# Patient Record
Sex: FEMALE | Race: WHITE | NOT HISPANIC OR LATINO
[De-identification: names, ages, dates, MRNs, and addresses within clinical notes are randomized per-mention and may not be internally consistent; named-entity substitution may affect disease eponyms.]

---

## 2017-01-17 ENCOUNTER — APPOINTMENT (OUTPATIENT)
Dept: ORTHOPEDIC SURGERY | Facility: CLINIC | Age: 67
End: 2017-01-17

## 2017-01-17 VITALS — WEIGHT: 226 LBS | BODY MASS INDEX: 44.37 KG/M2 | HEIGHT: 60 IN

## 2017-01-17 DIAGNOSIS — S43.81XA SPRAIN OF OTHER SPECIFIED PARTS OF RIGHT SHOULDER GIRDLE, INITIAL ENCOUNTER: ICD-10-CM

## 2017-01-17 DIAGNOSIS — S46.111A STRAIN OF MUSCLE, FASCIA AND TENDON OF LONG HEAD OF BICEPS, RIGHT ARM, INITIAL ENCOUNTER: ICD-10-CM

## 2017-01-17 DIAGNOSIS — M75.121 COMPLETE ROTATOR CUFF TEAR OR RUPTURE OF RIGHT SHOULDER, NOT SPECIFIED AS TRAUMATIC: ICD-10-CM

## 2018-02-21 ENCOUNTER — APPOINTMENT (OUTPATIENT)
Dept: VASCULAR SURGERY | Facility: CLINIC | Age: 68
End: 2018-02-21
Payer: MEDICARE

## 2018-02-21 VITALS
DIASTOLIC BLOOD PRESSURE: 74 MMHG | HEIGHT: 60 IN | OXYGEN SATURATION: 94 % | WEIGHT: 230 LBS | SYSTOLIC BLOOD PRESSURE: 153 MMHG | HEART RATE: 81 BPM | BODY MASS INDEX: 45.16 KG/M2

## 2018-02-21 PROCEDURE — 99213 OFFICE O/P EST LOW 20 MIN: CPT | Mod: 25

## 2018-02-21 PROCEDURE — 93971 EXTREMITY STUDY: CPT

## 2018-07-19 VITALS
OXYGEN SATURATION: 98 % | RESPIRATION RATE: 18 BRPM | TEMPERATURE: 98 F | SYSTOLIC BLOOD PRESSURE: 142 MMHG | HEIGHT: 60 IN | DIASTOLIC BLOOD PRESSURE: 64 MMHG | WEIGHT: 229.5 LBS | HEART RATE: 93 BPM

## 2018-07-19 RX ORDER — TRIMETHOPRIM HYDROCHLORIDE 50 MG/5ML
0 SOLUTION ORAL
Qty: 0 | Refills: 0 | COMMUNITY

## 2018-07-19 RX ORDER — METHOCARBAMOL 500 MG/1
0 TABLET, FILM COATED ORAL
Qty: 0 | Refills: 0 | COMMUNITY

## 2018-07-19 NOTE — PATIENT PROFILE ADULT. - PSH
H/O arthroscopy of left knee    H/O arthroscopy of right knee  x2  H/O carpal tunnel repair  left  H/O vascular surgery  left lower extremity  History of cholecystectomy    History of total hysterectomy    S/P rotator cuff repair  right H/O arthroscopy of left knee    H/O arthroscopy of right knee  x2  H/O carpal tunnel repair  left  H/O vascular surgery  left lower extremity  History of cholecystectomy    History of pubovaginal sling    History of total hysterectomy    S/P rotator cuff repair  right

## 2018-07-19 NOTE — PATIENT PROFILE ADULT. - PMH
Aortic valve disorder    Asthma    GERD (gastroesophageal reflux disease)    Hypercholesterolemia    Hypertension    Hypothyroidism    Lumbar spondylosis    Lung nodule    MERYL (obstructive sleep apnea)  syndrome  Osteoarthritis of knee    Peripheral venous insufficiency    Recurrent urinary tract infection    Vitamin D deficiency Aortic valve disorder    Asthma    GERD (gastroesophageal reflux disease)    Hypercholesterolemia    Hypothyroidism    Lumbar spondylosis    Lung nodule  right  MERYL (obstructive sleep apnea)  syndrome  Osteoarthritis of knee    Peripheral venous insufficiency    Recurrent urinary tract infection    Vitamin D deficiency Aortic valve disorder    Asthma    GERD (gastroesophageal reflux disease)    Hypercholesterolemia    Hypothyroidism    Lumbar spondylosis    MERYL (obstructive sleep apnea)  syndrome  MERYL on CPAP    Osteoarthritis of knee    Peripheral venous insufficiency    Recurrent urinary tract infection    Vitamin D deficiency

## 2018-07-20 ENCOUNTER — INPATIENT (INPATIENT)
Facility: HOSPITAL | Age: 68
LOS: 5 days | Discharge: HOME HEALTH | DRG: 519 | End: 2018-07-26
Attending: NEUROLOGICAL SURGERY | Admitting: NEUROLOGICAL SURGERY
Payer: MEDICARE

## 2018-07-20 DIAGNOSIS — M47.816 SPONDYLOSIS WITHOUT MYELOPATHY OR RADICULOPATHY, LUMBAR REGION: ICD-10-CM

## 2018-07-20 DIAGNOSIS — Z90.710 ACQUIRED ABSENCE OF BOTH CERVIX AND UTERUS: Chronic | ICD-10-CM

## 2018-07-20 DIAGNOSIS — Z98.890 OTHER SPECIFIED POSTPROCEDURAL STATES: Chronic | ICD-10-CM

## 2018-07-20 DIAGNOSIS — Z90.49 ACQUIRED ABSENCE OF OTHER SPECIFIED PARTS OF DIGESTIVE TRACT: Chronic | ICD-10-CM

## 2018-07-20 RX ORDER — VANCOMYCIN HCL 1 G
1000 VIAL (EA) INTRAVENOUS EVERY 12 HOURS
Qty: 0 | Refills: 0 | Status: COMPLETED | OUTPATIENT
Start: 2018-07-20 | End: 2018-07-21

## 2018-07-20 RX ORDER — BUDESONIDE AND FORMOTEROL FUMARATE DIHYDRATE 160; 4.5 UG/1; UG/1
2 AEROSOL RESPIRATORY (INHALATION)
Qty: 0 | Refills: 0 | Status: DISCONTINUED | OUTPATIENT
Start: 2018-07-20 | End: 2018-07-20

## 2018-07-20 RX ORDER — OXYCODONE AND ACETAMINOPHEN 5; 325 MG/1; MG/1
1 TABLET ORAL EVERY 4 HOURS
Qty: 0 | Refills: 0 | Status: DISCONTINUED | OUTPATIENT
Start: 2018-07-20 | End: 2018-07-22

## 2018-07-20 RX ORDER — ALBUTEROL 90 UG/1
2 AEROSOL, METERED ORAL
Qty: 0 | Refills: 0 | COMMUNITY

## 2018-07-20 RX ORDER — OXYCODONE AND ACETAMINOPHEN 5; 325 MG/1; MG/1
2 TABLET ORAL EVERY 6 HOURS
Qty: 0 | Refills: 0 | Status: DISCONTINUED | OUTPATIENT
Start: 2018-07-20 | End: 2018-07-22

## 2018-07-20 RX ORDER — SODIUM CHLORIDE 9 MG/ML
1000 INJECTION INTRAMUSCULAR; INTRAVENOUS; SUBCUTANEOUS
Qty: 0 | Refills: 0 | Status: DISCONTINUED | OUTPATIENT
Start: 2018-07-20 | End: 2018-07-22

## 2018-07-20 RX ORDER — DOCUSATE SODIUM 100 MG
100 CAPSULE ORAL THREE TIMES A DAY
Qty: 0 | Refills: 0 | Status: DISCONTINUED | OUTPATIENT
Start: 2018-07-20 | End: 2018-07-26

## 2018-07-20 RX ORDER — FLUTICASONE PROPIONATE AND SALMETEROL 50; 250 UG/1; UG/1
1 POWDER ORAL; RESPIRATORY (INHALATION)
Qty: 0 | Refills: 0 | COMMUNITY

## 2018-07-20 RX ORDER — SIMVASTATIN 20 MG/1
1 TABLET, FILM COATED ORAL
Qty: 0 | Refills: 0 | COMMUNITY

## 2018-07-20 RX ORDER — METOCLOPRAMIDE HCL 10 MG
10 TABLET ORAL ONCE
Qty: 0 | Refills: 0 | Status: COMPLETED | OUTPATIENT
Start: 2018-07-20 | End: 2018-07-20

## 2018-07-20 RX ORDER — SIMVASTATIN 20 MG/1
20 TABLET, FILM COATED ORAL AT BEDTIME
Qty: 0 | Refills: 0 | Status: DISCONTINUED | OUTPATIENT
Start: 2018-07-20 | End: 2018-07-26

## 2018-07-20 RX ORDER — ONDANSETRON 8 MG/1
4 TABLET, FILM COATED ORAL EVERY 6 HOURS
Qty: 0 | Refills: 0 | Status: DISCONTINUED | OUTPATIENT
Start: 2018-07-20 | End: 2018-07-26

## 2018-07-20 RX ORDER — HYDROMORPHONE HYDROCHLORIDE 2 MG/ML
1 INJECTION INTRAMUSCULAR; INTRAVENOUS; SUBCUTANEOUS ONCE
Qty: 0 | Refills: 0 | Status: DISCONTINUED | OUTPATIENT
Start: 2018-07-20 | End: 2018-07-20

## 2018-07-20 RX ORDER — SENNA PLUS 8.6 MG/1
2 TABLET ORAL AT BEDTIME
Qty: 0 | Refills: 0 | Status: DISCONTINUED | OUTPATIENT
Start: 2018-07-20 | End: 2018-07-26

## 2018-07-20 RX ORDER — ALBUTEROL 90 UG/1
2 AEROSOL, METERED ORAL
Qty: 0 | Refills: 0 | Status: DISCONTINUED | OUTPATIENT
Start: 2018-07-20 | End: 2018-07-26

## 2018-07-20 RX ORDER — BUDESONIDE AND FORMOTEROL FUMARATE DIHYDRATE 160; 4.5 UG/1; UG/1
2 AEROSOL RESPIRATORY (INHALATION)
Qty: 0 | Refills: 0 | Status: DISCONTINUED | OUTPATIENT
Start: 2018-07-20 | End: 2018-07-26

## 2018-07-20 RX ORDER — LEVOTHYROXINE SODIUM 125 MCG
25 TABLET ORAL DAILY
Qty: 0 | Refills: 0 | Status: DISCONTINUED | OUTPATIENT
Start: 2018-07-20 | End: 2018-07-26

## 2018-07-20 RX ORDER — HYDROMORPHONE HYDROCHLORIDE 2 MG/ML
1 INJECTION INTRAMUSCULAR; INTRAVENOUS; SUBCUTANEOUS
Qty: 0 | Refills: 0 | Status: DISCONTINUED | OUTPATIENT
Start: 2018-07-20 | End: 2018-07-20

## 2018-07-20 RX ORDER — ESOMEPRAZOLE MAGNESIUM 40 MG/1
1 CAPSULE, DELAYED RELEASE ORAL
Qty: 0 | Refills: 0 | COMMUNITY

## 2018-07-20 RX ORDER — PANTOPRAZOLE SODIUM 20 MG/1
40 TABLET, DELAYED RELEASE ORAL
Qty: 0 | Refills: 0 | Status: DISCONTINUED | OUTPATIENT
Start: 2018-07-20 | End: 2018-07-21

## 2018-07-20 RX ORDER — LEVOTHYROXINE SODIUM 125 MCG
1 TABLET ORAL
Qty: 0 | Refills: 0 | COMMUNITY

## 2018-07-20 RX ADMIN — HYDROMORPHONE HYDROCHLORIDE 1 MILLIGRAM(S): 2 INJECTION INTRAMUSCULAR; INTRAVENOUS; SUBCUTANEOUS at 16:43

## 2018-07-20 RX ADMIN — ONDANSETRON 4 MILLIGRAM(S): 8 TABLET, FILM COATED ORAL at 21:42

## 2018-07-20 RX ADMIN — HYDROMORPHONE HYDROCHLORIDE 1 MILLIGRAM(S): 2 INJECTION INTRAMUSCULAR; INTRAVENOUS; SUBCUTANEOUS at 11:15

## 2018-07-20 RX ADMIN — HYDROMORPHONE HYDROCHLORIDE 1 MILLIGRAM(S): 2 INJECTION INTRAMUSCULAR; INTRAVENOUS; SUBCUTANEOUS at 22:18

## 2018-07-20 RX ADMIN — SODIUM CHLORIDE 60 MILLILITER(S): 9 INJECTION INTRAMUSCULAR; INTRAVENOUS; SUBCUTANEOUS at 16:46

## 2018-07-20 RX ADMIN — Medication 250 MILLIGRAM(S): at 18:09

## 2018-07-20 RX ADMIN — ONDANSETRON 4 MILLIGRAM(S): 8 TABLET, FILM COATED ORAL at 14:07

## 2018-07-20 RX ADMIN — HYDROMORPHONE HYDROCHLORIDE 1 MILLIGRAM(S): 2 INJECTION INTRAMUSCULAR; INTRAVENOUS; SUBCUTANEOUS at 23:00

## 2018-07-20 RX ADMIN — HYDROMORPHONE HYDROCHLORIDE 1 MILLIGRAM(S): 2 INJECTION INTRAMUSCULAR; INTRAVENOUS; SUBCUTANEOUS at 11:01

## 2018-07-20 RX ADMIN — HYDROMORPHONE HYDROCHLORIDE 1 MILLIGRAM(S): 2 INJECTION INTRAMUSCULAR; INTRAVENOUS; SUBCUTANEOUS at 17:00

## 2018-07-20 RX ADMIN — Medication 10 MILLIGRAM(S): at 17:03

## 2018-07-20 NOTE — BRIEF OPERATIVE NOTE - PROCEDURE
<<-----Click on this checkbox to enter Procedure Lumbar laminectomy  07/20/2018  Left L2-L3, L3-L4  Active  VZHIGIN

## 2018-07-20 NOTE — PROGRESS NOTE ADULT - SUBJECTIVE AND OBJECTIVE BOX
NEUROSURGERY POST OP NOTE:    POD# 0 S/P left L2-L3, L3-L4 lumbar laminectomy, intraop CSF leak repaired with muscle tissue and dura seal    S: Pt seen and examined at bedside. Reports mild incision site pain. Denies acute weakness/loss of sensation of extremities      T(C): 35.4 (07-20-18 @ 10:35), Max: 35.4 (07-20-18 @ 10:35)  HR: 92 (07-20-18 @ 14:00) (82 - 92)  BP: 146/61 (07-20-18 @ 14:00) (122/59 - 156/61)  RR: 12 (07-20-18 @ 14:00) (12 - 18)  SpO2: 98% (07-20-18 @ 14:00) (94% - 100%)      07-20-18 @ 07:01  -  07-20-18 @ 15:05  --------------------------------------------------------  IN: 180 mL / OUT: 900 mL / NET: -720 mL      buDESOnide 160 MICROgram(s)/formoterol 4.5 MICROgram(s) Inhaler 2 Puff(s) Inhalation two times a day  docusate sodium 100 milliGRAM(s) Oral three times a day  HYDROmorphone  Injectable 1 milliGRAM(s) IV Push every 1 hour PRN  ondansetron Injectable 4 milliGRAM(s) IV Push every 6 hours PRN  oxyCODONE    5 mG/acetaminophen 325 mG 1 Tablet(s) Oral every 4 hours PRN  oxyCODONE    5 mG/acetaminophen 325 mG 2 Tablet(s) Oral every 6 hours PRN  senna 2 Tablet(s) Oral at bedtime PRN  sodium chloride 0.9%. 1000 milliLiter(s) IV Continuous <Continuous>  vancomycin  IVPB 1000 milliGRAM(s) IV Intermittent every 12 hours    Exam:  Neurological: AAOx3, FC, speech coherent  CNII-XII: EOM intact, PERRL, face symmetric, tongue midline  Motor: MAEx 4 5/5 UE and LE B/L  SILT throughout  Back incision site C/D/I    Assessment: 68yFemale s/p left L2-L3, L3-L4 lumbar laminectomy, intraop CSF leak repaired with muscle tissue and dura seal    Plan:  -spinal checks  -abdominal binder for comfort  -flat x48h  -remove hernandez in AM  -IVF hydration for now, advance diet as tolerated  -PT/OOB  -DVT prophylaxis: SCDs  -pain control: percocet, dilaudid IV prn, postop vanco  -continue home medications: zocor, synthroid, advair  -postop vanco  -Dispo pending  -D/w Dr. Figueroa NEUROSURGERY POST OP NOTE:    POD# 0 S/P left L2-L3, L3-L4 lumbar laminectomy, intraop CSF leak repaired with muscle tissue and dura seal    S: Pt seen and examined at bedside. Reports mild incision site pain. Denies acute weakness/loss of sensation of extremities      T(C): 35.4 (07-20-18 @ 10:35), Max: 35.4 (07-20-18 @ 10:35)  HR: 92 (07-20-18 @ 14:00) (82 - 92)  BP: 146/61 (07-20-18 @ 14:00) (122/59 - 156/61)  RR: 12 (07-20-18 @ 14:00) (12 - 18)  SpO2: 98% (07-20-18 @ 14:00) (94% - 100%)      07-20-18 @ 07:01  -  07-20-18 @ 15:05  --------------------------------------------------------  IN: 180 mL / OUT: 900 mL / NET: -720 mL      buDESOnide 160 MICROgram(s)/formoterol 4.5 MICROgram(s) Inhaler 2 Puff(s) Inhalation two times a day  docusate sodium 100 milliGRAM(s) Oral three times a day  HYDROmorphone  Injectable 1 milliGRAM(s) IV Push every 1 hour PRN  ondansetron Injectable 4 milliGRAM(s) IV Push every 6 hours PRN  oxyCODONE    5 mG/acetaminophen 325 mG 1 Tablet(s) Oral every 4 hours PRN  oxyCODONE    5 mG/acetaminophen 325 mG 2 Tablet(s) Oral every 6 hours PRN  senna 2 Tablet(s) Oral at bedtime PRN  sodium chloride 0.9%. 1000 milliLiter(s) IV Continuous <Continuous>  vancomycin  IVPB 1000 milliGRAM(s) IV Intermittent every 12 hours    Exam:  Neurological: AAOx3, FC, speech coherent  CNII-XII: EOM intact, PERRL, face symmetric, tongue midline  Motor: MAEx 4 5/5 UE and LE B/L  SILT throughout  Back incision site C/D/I    Assessment: 68yFemale s/p left L2-L3, L3-L4 lumbar laminectomy, intraop CSF leak repaired with muscle tissue and dura seal    Plan:  -spinal checks  -abdominal binder for comfort  -flat x48h, defer PT for now  -remove hernandez in AM  -IVF hydration for now, advance diet as tolerated  -DVT prophylaxis: SCDs  -pain control: percocet, dilaudid IV prn, postop vanco  -continue home medications: zocor, synthroid, advair  -postop vanco  -Dispo pending  -D/w Dr. Figueroa

## 2018-07-20 NOTE — H&P PST ADULT - ASSESSMENT
Pt is 67yo female with Lower back pain and left leg pain, here for an elective surgery with Dr. Figueroa

## 2018-07-20 NOTE — H&P PST ADULT - PSH
H/O arthroscopy of left knee    H/O arthroscopy of right knee  x2  H/O carpal tunnel repair  left  H/O vascular surgery  left lower extremity  History of cholecystectomy    History of pubovaginal sling    History of total hysterectomy    S/P rotator cuff repair  right

## 2018-07-20 NOTE — H&P PST ADULT - HISTORY OF PRESENT ILLNESS
Pt is 69yo female with Lower back pain and left leg pain, here for an elective surgery with Dr. Figueroa

## 2018-07-20 NOTE — H&P PST ADULT - PMH
Aortic valve disorder    Asthma    GERD (gastroesophageal reflux disease)    Hypercholesterolemia    Hypothyroidism    Lumbar spondylosis    MERYL (obstructive sleep apnea)  syndrome  MERYL on CPAP    Osteoarthritis of knee    Peripheral venous insufficiency    Recurrent urinary tract infection    Vitamin D deficiency

## 2018-07-21 LAB
ANION GAP SERPL CALC-SCNC: 13 MMOL/L — SIGNIFICANT CHANGE UP (ref 5–17)
BUN SERPL-MCNC: 15 MG/DL — SIGNIFICANT CHANGE UP (ref 7–23)
CALCIUM SERPL-MCNC: 8.4 MG/DL — SIGNIFICANT CHANGE UP (ref 8.4–10.5)
CHLORIDE SERPL-SCNC: 102 MMOL/L — SIGNIFICANT CHANGE UP (ref 96–108)
CO2 SERPL-SCNC: 26 MMOL/L — SIGNIFICANT CHANGE UP (ref 22–31)
CREAT SERPL-MCNC: 0.65 MG/DL — SIGNIFICANT CHANGE UP (ref 0.5–1.3)
GLUCOSE SERPL-MCNC: 123 MG/DL — HIGH (ref 70–99)
HCT VFR BLD CALC: 38.3 % — SIGNIFICANT CHANGE UP (ref 34.5–45)
HGB BLD-MCNC: 11.7 G/DL — SIGNIFICANT CHANGE UP (ref 11.5–15.5)
MAGNESIUM SERPL-MCNC: 2.2 MG/DL — SIGNIFICANT CHANGE UP (ref 1.6–2.6)
MCHC RBC-ENTMCNC: 25.3 PG — LOW (ref 27–34)
MCHC RBC-ENTMCNC: 30.5 G/DL — LOW (ref 32–36)
MCV RBC AUTO: 82.9 FL — SIGNIFICANT CHANGE UP (ref 80–100)
PHOSPHATE SERPL-MCNC: 4.3 MG/DL — SIGNIFICANT CHANGE UP (ref 2.5–4.5)
PLATELET # BLD AUTO: 345 K/UL — SIGNIFICANT CHANGE UP (ref 150–400)
POTASSIUM SERPL-MCNC: 3.8 MMOL/L — SIGNIFICANT CHANGE UP (ref 3.5–5.3)
POTASSIUM SERPL-SCNC: 3.8 MMOL/L — SIGNIFICANT CHANGE UP (ref 3.5–5.3)
RBC # BLD: 4.62 M/UL — SIGNIFICANT CHANGE UP (ref 3.8–5.2)
RBC # FLD: 14.7 % — SIGNIFICANT CHANGE UP (ref 10.3–16.9)
SODIUM SERPL-SCNC: 141 MMOL/L — SIGNIFICANT CHANGE UP (ref 135–145)
WBC # BLD: 14.8 K/UL — HIGH (ref 3.8–10.5)
WBC # FLD AUTO: 14.8 K/UL — HIGH (ref 3.8–10.5)

## 2018-07-21 RX ORDER — ACETAMINOPHEN 500 MG
650 TABLET ORAL ONCE
Qty: 0 | Refills: 0 | Status: COMPLETED | OUTPATIENT
Start: 2018-07-21 | End: 2018-07-21

## 2018-07-21 RX ORDER — HYDROMORPHONE HYDROCHLORIDE 2 MG/ML
0.5 INJECTION INTRAMUSCULAR; INTRAVENOUS; SUBCUTANEOUS ONCE
Qty: 0 | Refills: 0 | Status: DISCONTINUED | OUTPATIENT
Start: 2018-07-21 | End: 2018-07-21

## 2018-07-21 RX ORDER — OXYCODONE HYDROCHLORIDE 5 MG/1
10 TABLET ORAL
Qty: 0 | Refills: 0 | Status: DISCONTINUED | OUTPATIENT
Start: 2018-07-21 | End: 2018-07-22

## 2018-07-21 RX ORDER — OXYCODONE HYDROCHLORIDE 5 MG/1
10 TABLET ORAL ONCE
Qty: 0 | Refills: 0 | Status: DISCONTINUED | OUTPATIENT
Start: 2018-07-21 | End: 2018-07-21

## 2018-07-21 RX ADMIN — PANTOPRAZOLE SODIUM 40 MILLIGRAM(S): 20 TABLET, DELAYED RELEASE ORAL at 17:58

## 2018-07-21 RX ADMIN — BUDESONIDE AND FORMOTEROL FUMARATE DIHYDRATE 2 PUFF(S): 160; 4.5 AEROSOL RESPIRATORY (INHALATION) at 17:58

## 2018-07-21 RX ADMIN — Medication 250 MILLIGRAM(S): at 06:04

## 2018-07-21 RX ADMIN — SIMVASTATIN 20 MILLIGRAM(S): 20 TABLET, FILM COATED ORAL at 21:15

## 2018-07-21 RX ADMIN — HYDROMORPHONE HYDROCHLORIDE 0.5 MILLIGRAM(S): 2 INJECTION INTRAMUSCULAR; INTRAVENOUS; SUBCUTANEOUS at 04:09

## 2018-07-21 RX ADMIN — HYDROMORPHONE HYDROCHLORIDE 0.5 MILLIGRAM(S): 2 INJECTION INTRAMUSCULAR; INTRAVENOUS; SUBCUTANEOUS at 19:00

## 2018-07-21 RX ADMIN — OXYCODONE HYDROCHLORIDE 10 MILLIGRAM(S): 5 TABLET ORAL at 11:19

## 2018-07-21 RX ADMIN — Medication 650 MILLIGRAM(S): at 14:52

## 2018-07-21 RX ADMIN — OXYCODONE AND ACETAMINOPHEN 2 TABLET(S): 5; 325 TABLET ORAL at 23:38

## 2018-07-21 RX ADMIN — Medication 100 MILLIGRAM(S): at 21:15

## 2018-07-21 RX ADMIN — OXYCODONE HYDROCHLORIDE 10 MILLIGRAM(S): 5 TABLET ORAL at 12:10

## 2018-07-21 RX ADMIN — Medication 25 MICROGRAM(S): at 06:04

## 2018-07-21 RX ADMIN — HYDROMORPHONE HYDROCHLORIDE 0.5 MILLIGRAM(S): 2 INJECTION INTRAMUSCULAR; INTRAVENOUS; SUBCUTANEOUS at 18:08

## 2018-07-21 RX ADMIN — OXYCODONE AND ACETAMINOPHEN 2 TABLET(S): 5; 325 TABLET ORAL at 21:15

## 2018-07-21 RX ADMIN — Medication 650 MILLIGRAM(S): at 15:20

## 2018-07-21 NOTE — PROGRESS NOTE ADULT - SUBJECTIVE AND OBJECTIVE BOX
HPI:  Pt is 67yo female with Lower back pain and left leg pain, here for an elective surgery with Dr. Figueroa (20 Jul 2018 09:57)    OVERNIGHT EVENTS: No acute events overnight. Patient c/o HA and incisional pain. Afebrile, neuro stable    7/20: POD #0 s/p L L2-3 and L3-4 laminectomy, CSF leak intraop   7/21: POD #1 c/o HA and incisional pain overnight. Patient flat today. Afebrile, neuro stable    Vital Signs Last 24 Hrs  T(C): 37.3 (20 Jul 2018 21:34), Max: 37.3 (20 Jul 2018 21:34)  T(F): 99.1 (20 Jul 2018 21:34), Max: 99.1 (20 Jul 2018 21:34)  HR: 96 (21 Jul 2018 00:20) (82 - 104)  BP: 113/54 (20 Jul 2018 21:34) (106/53 - 156/61)  BP(mean): 96 (20 Jul 2018 12:35) (92 - 126)  RR: 15 (21 Jul 2018 00:20) (12 - 18)  SpO2: 97% (21 Jul 2018 00:20) (94% - 100%)    I&O's Summary    20 Jul 2018 07:01  -  21 Jul 2018 01:28  --------------------------------------------------------  IN: 1340 mL / OUT: 1655 mL / NET: -315 mL        PHYSICAL EXAM:  Gen: laying in hospital bed in supine, NAD  Neurological: AA+Ox3, opens eyes spontaneously FC  CN II-XII grossly intact, PERRL, EOMI  Motor exam:  MAEx4, 5/5 strength throughout  Cardiovascular: regular rate and rhythm  Respiratory: clear to auscultation  Gastrointestinal: soft, nontender, nondistended  Incision/Wound: C/D/I    TUBES/LINES:  [] Hernandez  [] Lumbar Drain  [] Wound Drains  [] Others      DIET:  [] NPO  [x] Mechanical  [] Tube feeds    LABS:                  CAPILLARY BLOOD GLUCOSE          Drug Levels: [] N/A    CSF Analysis: [] N/A      Allergies    Ceclor (Swelling)  Flagyl (Rash)  gabapentin (Rash)  lidocaine (Headache)  tramadol (Rash)    Intolerances      MEDICATIONS:  Antibiotics:  vancomycin  IVPB 1000 milliGRAM(s) IV Intermittent every 12 hours    Neuro:  HYDROmorphone  Injectable 0.5 milliGRAM(s) IV Push once  ondansetron Injectable 4 milliGRAM(s) IV Push every 6 hours PRN  oxyCODONE    5 mG/acetaminophen 325 mG 1 Tablet(s) Oral every 4 hours PRN  oxyCODONE    5 mG/acetaminophen 325 mG 2 Tablet(s) Oral every 6 hours PRN    Anticoagulation:    OTHER:  ALBUTerol    90 MICROgram(s) HFA Inhaler 2 Puff(s) Inhalation two times a day PRN  buDESOnide 160 MICROgram(s)/formoterol 4.5 MICROgram(s) Inhaler 2 Puff(s) Inhalation two times a day  docusate sodium 100 milliGRAM(s) Oral three times a day  levothyroxine 25 MICROGram(s) Oral daily  pantoprazole    Tablet 40 milliGRAM(s) Oral before breakfast  senna 2 Tablet(s) Oral at bedtime PRN  simvastatin 20 milliGRAM(s) Oral at bedtime    IVF:  sodium chloride 0.9%. 1000 milliLiter(s) IV Continuous <Continuous>    CULTURES:    RADIOLOGY & ADDITIONAL TESTS:      ASSESSMENT:  68y Female s/p L L2-3 and L3-4 laminectomy,    PLAN:  NEURO:  - neuro checks  - vitals checks  - pain control  - remain flat for 48 hours  - continue simvastatin   - continue Synthroid   - regular diet  - bowel regimen: colace, senna  - hernandez in place   - DVT PROPHYLAXIS: SCD's  - no PT/OOB until Sunday     DISPOSITION: floor status    d/w Dr. Figueroa

## 2018-07-22 LAB
ANION GAP SERPL CALC-SCNC: 11 MMOL/L — SIGNIFICANT CHANGE UP (ref 5–17)
APPEARANCE UR: CLEAR — SIGNIFICANT CHANGE UP
BILIRUB UR-MCNC: NEGATIVE — SIGNIFICANT CHANGE UP
BUN SERPL-MCNC: 10 MG/DL — SIGNIFICANT CHANGE UP (ref 7–23)
CALCIUM SERPL-MCNC: 8.7 MG/DL — SIGNIFICANT CHANGE UP (ref 8.4–10.5)
CHLORIDE SERPL-SCNC: 100 MMOL/L — SIGNIFICANT CHANGE UP (ref 96–108)
CO2 SERPL-SCNC: 26 MMOL/L — SIGNIFICANT CHANGE UP (ref 22–31)
COLOR SPEC: YELLOW — SIGNIFICANT CHANGE UP
CREAT SERPL-MCNC: 0.5 MG/DL — SIGNIFICANT CHANGE UP (ref 0.5–1.3)
DIFF PNL FLD: ABNORMAL
GLUCOSE SERPL-MCNC: 128 MG/DL — HIGH (ref 70–99)
GLUCOSE UR QL: NEGATIVE — SIGNIFICANT CHANGE UP
HCT VFR BLD CALC: 40.2 % — SIGNIFICANT CHANGE UP (ref 34.5–45)
HGB BLD-MCNC: 12.6 G/DL — SIGNIFICANT CHANGE UP (ref 11.5–15.5)
KETONES UR-MCNC: NEGATIVE — SIGNIFICANT CHANGE UP
LEUKOCYTE ESTERASE UR-ACNC: NEGATIVE — SIGNIFICANT CHANGE UP
MAGNESIUM SERPL-MCNC: 2.1 MG/DL — SIGNIFICANT CHANGE UP (ref 1.6–2.6)
MCHC RBC-ENTMCNC: 25.8 PG — LOW (ref 27–34)
MCHC RBC-ENTMCNC: 31.3 G/DL — LOW (ref 32–36)
MCV RBC AUTO: 82.4 FL — SIGNIFICANT CHANGE UP (ref 80–100)
NITRITE UR-MCNC: NEGATIVE — SIGNIFICANT CHANGE UP
PH UR: 6.5 — SIGNIFICANT CHANGE UP (ref 5–8)
PHOSPHATE SERPL-MCNC: 2.3 MG/DL — LOW (ref 2.5–4.5)
PLATELET # BLD AUTO: 291 K/UL — SIGNIFICANT CHANGE UP (ref 150–400)
POTASSIUM SERPL-MCNC: 3.8 MMOL/L — SIGNIFICANT CHANGE UP (ref 3.5–5.3)
POTASSIUM SERPL-SCNC: 3.8 MMOL/L — SIGNIFICANT CHANGE UP (ref 3.5–5.3)
PROT UR-MCNC: 30 MG/DL
RBC # BLD: 4.88 M/UL — SIGNIFICANT CHANGE UP (ref 3.8–5.2)
RBC # FLD: 14.9 % — SIGNIFICANT CHANGE UP (ref 10.3–16.9)
SODIUM SERPL-SCNC: 137 MMOL/L — SIGNIFICANT CHANGE UP (ref 135–145)
SP GR SPEC: 1.01 — SIGNIFICANT CHANGE UP (ref 1–1.03)
UROBILINOGEN FLD QL: 0.2 E.U./DL — SIGNIFICANT CHANGE UP
WBC # BLD: 15 K/UL — HIGH (ref 3.8–10.5)
WBC # FLD AUTO: 15 K/UL — HIGH (ref 3.8–10.5)

## 2018-07-22 PROCEDURE — 71045 X-RAY EXAM CHEST 1 VIEW: CPT | Mod: 26

## 2018-07-22 RX ORDER — HYDROMORPHONE HYDROCHLORIDE 2 MG/ML
0.5 INJECTION INTRAMUSCULAR; INTRAVENOUS; SUBCUTANEOUS ONCE
Qty: 0 | Refills: 0 | Status: DISCONTINUED | OUTPATIENT
Start: 2018-07-22 | End: 2018-07-23

## 2018-07-22 RX ORDER — SODIUM,POTASSIUM PHOSPHATES 278-250MG
1 POWDER IN PACKET (EA) ORAL THREE TIMES A DAY
Qty: 0 | Refills: 0 | Status: COMPLETED | OUTPATIENT
Start: 2018-07-22 | End: 2018-07-23

## 2018-07-22 RX ORDER — PANTOPRAZOLE SODIUM 20 MG/1
40 TABLET, DELAYED RELEASE ORAL DAILY
Qty: 0 | Refills: 0 | Status: DISCONTINUED | OUTPATIENT
Start: 2018-07-22 | End: 2018-07-26

## 2018-07-22 RX ORDER — ENOXAPARIN SODIUM 100 MG/ML
40 INJECTION SUBCUTANEOUS AT BEDTIME
Qty: 0 | Refills: 0 | Status: DISCONTINUED | OUTPATIENT
Start: 2018-07-22 | End: 2018-07-26

## 2018-07-22 RX ORDER — OXYCODONE HYDROCHLORIDE 5 MG/1
5 TABLET ORAL EVERY 6 HOURS
Qty: 0 | Refills: 0 | Status: DISCONTINUED | OUTPATIENT
Start: 2018-07-22 | End: 2018-07-24

## 2018-07-22 RX ORDER — OXYCODONE HYDROCHLORIDE 5 MG/1
10 TABLET ORAL EVERY 6 HOURS
Qty: 0 | Refills: 0 | Status: DISCONTINUED | OUTPATIENT
Start: 2018-07-22 | End: 2018-07-24

## 2018-07-22 RX ORDER — SODIUM CHLORIDE 9 MG/ML
1000 INJECTION INTRAMUSCULAR; INTRAVENOUS; SUBCUTANEOUS
Qty: 0 | Refills: 0 | Status: DISCONTINUED | OUTPATIENT
Start: 2018-07-22 | End: 2018-07-26

## 2018-07-22 RX ORDER — SIMETHICONE 80 MG/1
80 TABLET, CHEWABLE ORAL
Qty: 0 | Refills: 0 | Status: DISCONTINUED | OUTPATIENT
Start: 2018-07-22 | End: 2018-07-26

## 2018-07-22 RX ORDER — OXYCODONE HYDROCHLORIDE 5 MG/1
10 TABLET ORAL EVERY 12 HOURS
Qty: 0 | Refills: 0 | Status: DISCONTINUED | OUTPATIENT
Start: 2018-07-22 | End: 2018-07-22

## 2018-07-22 RX ORDER — ACETAMINOPHEN 500 MG
650 TABLET ORAL ONCE
Qty: 0 | Refills: 0 | Status: COMPLETED | OUTPATIENT
Start: 2018-07-22 | End: 2018-07-22

## 2018-07-22 RX ADMIN — SIMETHICONE 80 MILLIGRAM(S): 80 TABLET, CHEWABLE ORAL at 04:52

## 2018-07-22 RX ADMIN — ENOXAPARIN SODIUM 40 MILLIGRAM(S): 100 INJECTION SUBCUTANEOUS at 21:07

## 2018-07-22 RX ADMIN — PANTOPRAZOLE SODIUM 40 MILLIGRAM(S): 20 TABLET, DELAYED RELEASE ORAL at 12:03

## 2018-07-22 RX ADMIN — OXYCODONE HYDROCHLORIDE 10 MILLIGRAM(S): 5 TABLET ORAL at 22:27

## 2018-07-22 RX ADMIN — SIMVASTATIN 20 MILLIGRAM(S): 20 TABLET, FILM COATED ORAL at 21:07

## 2018-07-22 RX ADMIN — SIMETHICONE 80 MILLIGRAM(S): 80 TABLET, CHEWABLE ORAL at 09:14

## 2018-07-22 RX ADMIN — Medication 1 PACKET(S): at 21:07

## 2018-07-22 RX ADMIN — Medication 650 MILLIGRAM(S): at 19:04

## 2018-07-22 RX ADMIN — BUDESONIDE AND FORMOTEROL FUMARATE DIHYDRATE 2 PUFF(S): 160; 4.5 AEROSOL RESPIRATORY (INHALATION) at 06:56

## 2018-07-22 RX ADMIN — Medication 1 PACKET(S): at 13:05

## 2018-07-22 RX ADMIN — OXYCODONE HYDROCHLORIDE 10 MILLIGRAM(S): 5 TABLET ORAL at 14:05

## 2018-07-22 RX ADMIN — Medication 100 MILLIGRAM(S): at 21:07

## 2018-07-22 RX ADMIN — Medication 25 MICROGRAM(S): at 06:54

## 2018-07-22 RX ADMIN — OXYCODONE HYDROCHLORIDE 10 MILLIGRAM(S): 5 TABLET ORAL at 13:05

## 2018-07-22 RX ADMIN — OXYCODONE HYDROCHLORIDE 10 MILLIGRAM(S): 5 TABLET ORAL at 07:39

## 2018-07-22 RX ADMIN — Medication 100 MILLIGRAM(S): at 06:54

## 2018-07-22 RX ADMIN — OXYCODONE HYDROCHLORIDE 10 MILLIGRAM(S): 5 TABLET ORAL at 06:53

## 2018-07-22 RX ADMIN — Medication 100 MILLIGRAM(S): at 12:03

## 2018-07-22 RX ADMIN — OXYCODONE HYDROCHLORIDE 10 MILLIGRAM(S): 5 TABLET ORAL at 21:07

## 2018-07-22 RX ADMIN — BUDESONIDE AND FORMOTEROL FUMARATE DIHYDRATE 2 PUFF(S): 160; 4.5 AEROSOL RESPIRATORY (INHALATION) at 17:40

## 2018-07-22 NOTE — PROGRESS NOTE ADULT - SUBJECTIVE AND OBJECTIVE BOX
HPI:  Pt is 69yo female with Lower back pain and left leg pain, here for an elective surgery with Dr. Figueroa (20 Jul 2018 09:57)    OVERNIGHT EVENTS:  Vital Signs Last 24 Hrs  T(C): 37.3 (22 Jul 2018 08:57), Max: 37.8 (21 Jul 2018 16:26)  T(F): 99.1 (22 Jul 2018 08:57), Max: 100 (21 Jul 2018 16:26)  HR: 92 (22 Jul 2018 08:57) (92 - 102)  BP: 147/67 (22 Jul 2018 08:57) (106/49 - 147/67)  BP(mean): 89 (22 Jul 2018 05:00) (70 - 89)  RR: 18 (22 Jul 2018 08:57) (17 - 23)  SpO2: 94% (22 Jul 2018 08:57) (94% - 99%)    I&O's Summary    21 Jul 2018 07:01  -  22 Jul 2018 07:00  --------------------------------------------------------  IN: 1800 mL / OUT: 1300 mL / NET: 500 mL        PHYSICAL EXAM:  Gen: Laying in hospital bed comfortably, NAD  Neurological: AA+Ox3, opens eyes spontaneously, FC  Motor exam: MAEx4 with good strength  Cardiovascular: regular rate and rhythm  Respiratory: clear to auscultation  Gastrointestinal: soft, nontender, nondistended  Incision/Wound: lumbar site C/D/I     TUBES/LINES:  [] Coats  [] Lumbar Drain  [] Wound Drains  [] Others      DIET:  [] NPO  [x] Mechanical  [] Tube feeds    LABS:                        12.6   15.0  )-----------( 291      ( 22 Jul 2018 07:56 )             40.2     07-22    137  |  100  |  10  ----------------------------<  128<H>  3.8   |  26  |  0.50    Ca    8.7      22 Jul 2018 07:56  Phos  2.3     07-22  Mg     2.1     07-22              CAPILLARY BLOOD GLUCOSE          Drug Levels: [] N/A    CSF Analysis: [] N/A      Allergies    Ceclor (Swelling)  Flagyl (Rash)  gabapentin (Rash)  lidocaine (Headache)  tramadol (Rash)    Intolerances      MEDICATIONS:  Antibiotics:    Neuro:  ondansetron Injectable 4 milliGRAM(s) IV Push every 6 hours PRN  oxyCODONE    5 mG/acetaminophen 325 mG 1 Tablet(s) Oral every 4 hours PRN  oxyCODONE    5 mG/acetaminophen 325 mG 2 Tablet(s) Oral every 6 hours PRN  oxyCODONE    IR 10 milliGRAM(s) Oral two times a day    Anticoagulation:  enoxaparin Injectable 40 milliGRAM(s) SubCutaneous at bedtime    OTHER:  ALBUTerol    90 MICROgram(s) HFA Inhaler 2 Puff(s) Inhalation two times a day PRN  buDESOnide 160 MICROgram(s)/formoterol 4.5 MICROgram(s) Inhaler 2 Puff(s) Inhalation two times a day  docusate sodium 100 milliGRAM(s) Oral three times a day  levothyroxine 25 MICROGram(s) Oral daily  pantoprazole    Tablet 40 milliGRAM(s) Oral daily  senna 2 Tablet(s) Oral at bedtime PRN  simethicone 80 milliGRAM(s) Chew two times a day PRN  simvastatin 20 milliGRAM(s) Oral at bedtime    IVF:  potassium phosphate / sodium phosphate powder 1 Packet(s) Oral three times a day  sodium chloride 0.9%. 1000 milliLiter(s) IV Continuous <Continuous>    CULTURES:    RADIOLOGY & ADDITIONAL TESTS:      ASSESSMENT:  68y Female s/p L L2-3 and L3-4 laminectomy, POD #2    PLAN:  NEURO:  - Neuro checks  - vitals checks  - pain control   - bed rest   - HOB 15 degrees this morning, can be HOB 30 degrees after lunch time  - log roll only   - continue simvastatin   - regular diet  - bowel regimen: colace, senna  - continue protonix   - DVT PROPHYLAXIS: SCD's, SQL  - hold PT until tomorrow     DISPOSITION: floor status, pending PT    d/w Dr. Figueroa

## 2018-07-23 DIAGNOSIS — I35.9 NONRHEUMATIC AORTIC VALVE DISORDER, UNSPECIFIED: ICD-10-CM

## 2018-07-23 DIAGNOSIS — E78.00 PURE HYPERCHOLESTEROLEMIA, UNSPECIFIED: ICD-10-CM

## 2018-07-23 DIAGNOSIS — R50.9 FEVER, UNSPECIFIED: ICD-10-CM

## 2018-07-23 DIAGNOSIS — G47.33 OBSTRUCTIVE SLEEP APNEA (ADULT) (PEDIATRIC): ICD-10-CM

## 2018-07-23 DIAGNOSIS — J45.909 UNSPECIFIED ASTHMA, UNCOMPLICATED: ICD-10-CM

## 2018-07-23 LAB
ANION GAP SERPL CALC-SCNC: 12 MMOL/L — SIGNIFICANT CHANGE UP (ref 5–17)
BUN SERPL-MCNC: 10 MG/DL — SIGNIFICANT CHANGE UP (ref 7–23)
CALCIUM SERPL-MCNC: 8.3 MG/DL — LOW (ref 8.4–10.5)
CHLORIDE SERPL-SCNC: 98 MMOL/L — SIGNIFICANT CHANGE UP (ref 96–108)
CO2 SERPL-SCNC: 26 MMOL/L — SIGNIFICANT CHANGE UP (ref 22–31)
CREAT SERPL-MCNC: 0.52 MG/DL — SIGNIFICANT CHANGE UP (ref 0.5–1.3)
GLUCOSE SERPL-MCNC: 117 MG/DL — HIGH (ref 70–99)
HCT VFR BLD CALC: 37 % — SIGNIFICANT CHANGE UP (ref 34.5–45)
HGB BLD-MCNC: 11.7 G/DL — SIGNIFICANT CHANGE UP (ref 11.5–15.5)
MAGNESIUM SERPL-MCNC: 2 MG/DL — SIGNIFICANT CHANGE UP (ref 1.6–2.6)
MCHC RBC-ENTMCNC: 26.1 PG — LOW (ref 27–34)
MCHC RBC-ENTMCNC: 31.6 G/DL — LOW (ref 32–36)
MCV RBC AUTO: 82.6 FL — SIGNIFICANT CHANGE UP (ref 80–100)
PHOSPHATE SERPL-MCNC: 3 MG/DL — SIGNIFICANT CHANGE UP (ref 2.5–4.5)
PLATELET # BLD AUTO: 276 K/UL — SIGNIFICANT CHANGE UP (ref 150–400)
POTASSIUM SERPL-MCNC: 3.7 MMOL/L — SIGNIFICANT CHANGE UP (ref 3.5–5.3)
POTASSIUM SERPL-SCNC: 3.7 MMOL/L — SIGNIFICANT CHANGE UP (ref 3.5–5.3)
RBC # BLD: 4.48 M/UL — SIGNIFICANT CHANGE UP (ref 3.8–5.2)
RBC # FLD: 14.8 % — SIGNIFICANT CHANGE UP (ref 10.3–16.9)
SODIUM SERPL-SCNC: 136 MMOL/L — SIGNIFICANT CHANGE UP (ref 135–145)
WBC # BLD: 13.1 K/UL — HIGH (ref 3.8–10.5)
WBC # FLD AUTO: 13.1 K/UL — HIGH (ref 3.8–10.5)

## 2018-07-23 PROCEDURE — 93970 EXTREMITY STUDY: CPT | Mod: 26

## 2018-07-23 PROCEDURE — 99223 1ST HOSP IP/OBS HIGH 75: CPT | Mod: GC

## 2018-07-23 RX ORDER — ACETAMINOPHEN 500 MG
650 TABLET ORAL EVERY 6 HOURS
Qty: 0 | Refills: 0 | Status: DISCONTINUED | OUTPATIENT
Start: 2018-07-23 | End: 2018-07-26

## 2018-07-23 RX ORDER — CEFAZOLIN SODIUM 1 G
VIAL (EA) INJECTION
Qty: 0 | Refills: 0 | Status: DISCONTINUED | OUTPATIENT
Start: 2018-07-23 | End: 2018-07-23

## 2018-07-23 RX ORDER — CIPROFLOXACIN LACTATE 400MG/40ML
750 VIAL (ML) INTRAVENOUS EVERY 12 HOURS
Qty: 0 | Refills: 0 | Status: DISCONTINUED | OUTPATIENT
Start: 2018-07-23 | End: 2018-07-26

## 2018-07-23 RX ADMIN — Medication 100 MILLIGRAM(S): at 06:45

## 2018-07-23 RX ADMIN — SIMVASTATIN 20 MILLIGRAM(S): 20 TABLET, FILM COATED ORAL at 21:11

## 2018-07-23 RX ADMIN — Medication 100 MILLIGRAM(S): at 13:03

## 2018-07-23 RX ADMIN — Medication 750 MILLIGRAM(S): at 23:36

## 2018-07-23 RX ADMIN — BUDESONIDE AND FORMOTEROL FUMARATE DIHYDRATE 2 PUFF(S): 160; 4.5 AEROSOL RESPIRATORY (INHALATION) at 06:43

## 2018-07-23 RX ADMIN — Medication 100 MILLIGRAM(S): at 21:11

## 2018-07-23 RX ADMIN — Medication 1 CAPSULE(S): at 05:57

## 2018-07-23 RX ADMIN — ENOXAPARIN SODIUM 40 MILLIGRAM(S): 100 INJECTION SUBCUTANEOUS at 21:11

## 2018-07-23 RX ADMIN — SIMETHICONE 80 MILLIGRAM(S): 80 TABLET, CHEWABLE ORAL at 06:45

## 2018-07-23 RX ADMIN — OXYCODONE HYDROCHLORIDE 10 MILLIGRAM(S): 5 TABLET ORAL at 13:02

## 2018-07-23 RX ADMIN — Medication 650 MILLIGRAM(S): at 16:25

## 2018-07-23 RX ADMIN — Medication 25 MICROGRAM(S): at 06:45

## 2018-07-23 RX ADMIN — Medication 1 PACKET(S): at 06:45

## 2018-07-23 RX ADMIN — Medication 650 MILLIGRAM(S): at 09:16

## 2018-07-23 RX ADMIN — OXYCODONE HYDROCHLORIDE 10 MILLIGRAM(S): 5 TABLET ORAL at 23:21

## 2018-07-23 RX ADMIN — OXYCODONE HYDROCHLORIDE 10 MILLIGRAM(S): 5 TABLET ORAL at 06:44

## 2018-07-23 RX ADMIN — SODIUM CHLORIDE 60 MILLILITER(S): 9 INJECTION INTRAMUSCULAR; INTRAVENOUS; SUBCUTANEOUS at 12:02

## 2018-07-23 RX ADMIN — OXYCODONE HYDROCHLORIDE 10 MILLIGRAM(S): 5 TABLET ORAL at 06:57

## 2018-07-23 RX ADMIN — OXYCODONE HYDROCHLORIDE 10 MILLIGRAM(S): 5 TABLET ORAL at 14:02

## 2018-07-23 RX ADMIN — Medication 1 CAPSULE(S): at 04:47

## 2018-07-23 RX ADMIN — PANTOPRAZOLE SODIUM 40 MILLIGRAM(S): 20 TABLET, DELAYED RELEASE ORAL at 09:17

## 2018-07-23 RX ADMIN — OXYCODONE HYDROCHLORIDE 10 MILLIGRAM(S): 5 TABLET ORAL at 21:11

## 2018-07-23 RX ADMIN — BUDESONIDE AND FORMOTEROL FUMARATE DIHYDRATE 2 PUFF(S): 160; 4.5 AEROSOL RESPIRATORY (INHALATION) at 18:00

## 2018-07-23 NOTE — PHYSICAL THERAPY INITIAL EVALUATION ADULT - CRITERIA FOR SKILLED THERAPEUTIC INTERVENTIONS
impairments found/anticipated discharge recommendation/risk reduction/prevention/functional limitations in following categories/therapy frequency/rehab potential

## 2018-07-23 NOTE — CONSULT NOTE ADULT - SUBJECTIVE AND OBJECTIVE BOX
Patient is a 68y old  Female who presents with a chief complaint of Lower back pain and LLE pain (2018 09:57)      HPI:  Pt is 69yo female with Lower back pain and left leg pain, here for an elective surgery with Dr. Figueroa (2018 09:57)      PAST MEDICAL & SURGICAL HISTORY:  MERYL on CPAP  Hypothyroidism  Osteoarthritis of knee  GERD (gastroesophageal reflux disease)  Asthma  Peripheral venous insufficiency  Hypercholesterolemia  MERYL (obstructive sleep apnea): syndrome  Aortic valve disorder  Vitamin D deficiency  Recurrent urinary tract infection  Lumbar spondylosis  History of pubovaginal sling  H/O arthroscopy of left knee  S/P rotator cuff repair: right  H/O carpal tunnel repair: left  History of total hysterectomy  H/O vascular surgery: left lower extremity  History of cholecystectomy  H/O arthroscopy of right knee: x2      FAMILY HISTORY:      SOCIAL HISTORY:  Smoking Status: [ ] Current, [ ] Former, [ ] Never  Pack Years:    MEDICATIONS:  Pulmonary:  ALBUTerol    90 MICROgram(s) HFA Inhaler 2 Puff(s) Inhalation two times a day PRN  buDESOnide 160 MICROgram(s)/formoterol 4.5 MICROgram(s) Inhaler 2 Puff(s) Inhalation two times a day    Antimicrobials:    Anticoagulants:  enoxaparin Injectable 40 milliGRAM(s) SubCutaneous at bedtime    Onc:    GI/:  docusate sodium 100 milliGRAM(s) Oral three times a day  pantoprazole    Tablet 40 milliGRAM(s) Oral daily  senna 2 Tablet(s) Oral at bedtime PRN  simethicone 80 milliGRAM(s) Chew two times a day PRN    Endocrine:  levothyroxine 25 MICROGram(s) Oral daily  simvastatin 20 milliGRAM(s) Oral at bedtime    Cardiac:    Other Medications:  acetaminophen   Tablet 650 milliGRAM(s) Oral every 6 hours PRN  acetaminophen 300 mG/butalbital 50 mG/ caffeine 40 mG 1 Capsule(s) Oral every 6 hours PRN  ondansetron Injectable 4 milliGRAM(s) IV Push every 6 hours PRN  oxyCODONE    IR 5 milliGRAM(s) Oral every 6 hours PRN  oxyCODONE    IR 10 milliGRAM(s) Oral every 6 hours PRN  sodium chloride 0.9%. 1000 milliLiter(s) IV Continuous <Continuous>      Allergies    Ceclor (Swelling)  Flagyl (Rash)  gabapentin (Rash)  lidocaine (Headache)  tramadol (Rash)    Intolerances        Vital Signs Last 24 Hrs  T(C): 37.6 (2018 17:06), Max: 38.3 (2018 08:15)  T(F): 99.7 (2018 17:06), Max: 101 (2018 08:15)  HR: 96 (2018 20:26) (76 - 96)  BP: 133/58 (2018 20:26) (119/56 - 134/58)  BP(mean): 83 (2018 20:26) (80 - 83)  RR: 20 (2018 20:26) (14 - 20)  SpO2: 92% (2018 20:26) (92% - 97%)     @ 07:  -   @ 07:00  --------------------------------------------------------  IN: 1700 mL / OUT: 1300 mL / NET: 400 mL     @ 07: @ 22:38  --------------------------------------------------------  IN: 1800 mL / OUT: 1650 mL / NET: 150 mL          LABS:      CBC Full  -  ( 2018 06:27 )  WBC Count : 13.1 K/uL  Hemoglobin : 11.7 g/dL  Hematocrit : 37.0 %  Platelet Count - Automated : 276 K/uL  Mean Cell Volume : 82.6 fL  Mean Cell Hemoglobin : 26.1 pg  Mean Cell Hemoglobin Concentration : 31.6 g/dL  Auto Neutrophil # : x  Auto Lymphocyte # : x  Auto Monocyte # : x  Auto Eosinophil # : x  Auto Basophil # : x  Auto Neutrophil % : x  Auto Lymphocyte % : x  Auto Monocyte % : x  Auto Eosinophil % : x  Auto Basophil % : x        136  |  98  |  10  ----------------------------<  117<H>  3.7   |  26  |  0.52    Ca    8.3<L>      2018 06:27  Phos  3.0     07-  Mg     2.0     07-            Urinalysis Basic - ( 2018 19:29 )    Color: Yellow / Appearance: Clear / S.010 / pH: x  Gluc: x / Ketone: NEGATIVE  / Bili: Negative / Urobili: 0.2 E.U./dL   Blood: x / Protein: 30 mg/dL / Nitrite: NEGATIVE   Leuk Esterase: NEGATIVE / RBC: < 5 /HPF / WBC 5-10 /HPF   Sq Epi: x / Non Sq Epi: 5-10 /HPF / Bacteria: Present /HPF      venous doppler negative  CXR PVC and atelectasis          RADIOLOGY & ADDITIONAL STUDIES (The following images were personally reviewed):

## 2018-07-23 NOTE — CONSULT NOTE ADULT - ATTENDING COMMENTS
Statement Selected
Patient seen and examined with house-staff during bedside rounds.  Resident note read, including vitals, physical findings, laboratory data, and radiological reports.   Revisions included below.  Direct personal management at bed side and extensive interpretation of the data.  Plan was outlined and discussed in details with the housestaff.  Decision making of high complexity  Action taken for acute disease activity to reflect the level of care provided:  - medication reconciliation  - review laboratory data  I reviewed the chest x-ray and discussed the case with orthopedic. Was start IV antibiotic. Follow culture. Is no clinical evidence of endocarditis nor meningitis

## 2018-07-23 NOTE — PHYSICAL THERAPY INITIAL EVALUATION ADULT - ADDITIONAL COMMENTS
Pt lives w/  in private home w/ 12 stairs to negotiate. States that at baseline she was using a SC for ambulation on occasion 2/2 her R knee pain (also states that she typically wraps her knee in a ACE bandage for support). States that her last fall was a 3 months ago. No home health aide, no HPT

## 2018-07-23 NOTE — PHYSICAL THERAPY INITIAL EVALUATION ADULT - GAIT DEVIATIONS NOTED, PT EVAL
increased time in double stance/decreased weight-shifting ability/decreased kadi/decreased step length

## 2018-07-23 NOTE — PHYSICAL THERAPY INITIAL EVALUATION ADULT - GENERAL OBSERVATIONS, REHAB EVAL
Pt received semi-supine in bed +heplock, +tele, +lumbar incision C/D/I, +hernandez, cleared for PT bed>bathroom by neurosurgery team, c/o back and R knee pain

## 2018-07-23 NOTE — PROGRESS NOTE ADULT - SUBJECTIVE AND OBJECTIVE BOX
HPI:  Pt is 69yo female with Lower back pain and left leg pain, here for an elective surgery with Dr. Figueroa (2018 09:57)    OVERNIGHT EVENTS: Overnight spiked fever, given tylenol. UA neg. Denies acute weakness/loss of sensation of extremities.    Vital Signs Last 24 Hrs  T(C): 37.8 (2018 04:49), Max: 38.6 (2018 18:45)  T(F): 100 (2018 04:49), Max: 101.5 (2018 18:45)  HR: 84 (2018 06:43) (61 - 98)  BP: 134/58 (2018 04:49) (119/56 - 147/67)  BP(mean): 80 (2018 23:41) (80 - 89)  RR: 16 (2018 04:49) (14 - 20)  SpO2: 94% (2018 06:43) (92% - 97%)    I&O's Summary    2018 07:01  -  2018 07:00  --------------------------------------------------------  IN: 1700 mL / OUT: 1300 mL / NET: 400 mL      PHYSICAL EXAM:  Neurological: AAOx3, FC, speech coherent  CNII-XII: EOM intact, PERRL, face symmetric  Motor: MAEx4 5/5 UE and LE B/L  SILT throughout  Incision/Wound: Posterior lumbar incision site C/D/I    TUBES/LINES:  [] Hernandez  [] Lumbar Drain  [] Wound Drains  [] Others    DIET:  [] NPO  [x Mechanical  [] Tube feeds    LABS:                        11.7   13.1  )-----------( 276      ( 2018 06:27 )             37.0     07-23    136  |  98  |  10  ----------------------------<  117<H>  3.7   |  26  |  0.52    Ca    8.3<L>      2018 06:27  Phos  3.0     07-23  Mg     2.0     07-23        Urinalysis Basic - ( 2018 19:29 )    Color: Yellow / Appearance: Clear / S.010 / pH: x  Gluc: x / Ketone: NEGATIVE  / Bili: Negative / Urobili: 0.2 E.U./dL   Blood: x / Protein: 30 mg/dL / Nitrite: NEGATIVE   Leuk Esterase: NEGATIVE / RBC: < 5 /HPF / WBC 5-10 /HPF   Sq Epi: x / Non Sq Epi: 5-10 /HPF / Bacteria: Present /HPF          CAPILLARY BLOOD GLUCOSE          Drug Levels: [] N/A    CSF Analysis: [] N/A      Allergies    Ceclor (Swelling)  Flagyl (Rash)  gabapentin (Rash)  lidocaine (Headache)  tramadol (Rash)    Intolerances      MEDICATIONS:  Antibiotics:    Neuro:  acetaminophen   Tablet 650 milliGRAM(s) Oral every 6 hours PRN  acetaminophen 300 mG/butalbital 50 mG/ caffeine 40 mG 1 Capsule(s) Oral every 6 hours PRN  HYDROmorphone  Injectable 0.5 milliGRAM(s) IV Push once PRN  ondansetron Injectable 4 milliGRAM(s) IV Push every 6 hours PRN  oxyCODONE    IR 5 milliGRAM(s) Oral every 6 hours PRN  oxyCODONE    IR 10 milliGRAM(s) Oral every 6 hours PRN    Anticoagulation:  enoxaparin Injectable 40 milliGRAM(s) SubCutaneous at bedtime    OTHER:  ALBUTerol    90 MICROgram(s) HFA Inhaler 2 Puff(s) Inhalation two times a day PRN  buDESOnide 160 MICROgram(s)/formoterol 4.5 MICROgram(s) Inhaler 2 Puff(s) Inhalation two times a day  docusate sodium 100 milliGRAM(s) Oral three times a day  levothyroxine 25 MICROGram(s) Oral daily  pantoprazole    Tablet 40 milliGRAM(s) Oral daily  senna 2 Tablet(s) Oral at bedtime PRN  simethicone 80 milliGRAM(s) Chew two times a day PRN  simvastatin 20 milliGRAM(s) Oral at bedtime    IVF:  sodium chloride 0.9%. 1000 milliLiter(s) IV Continuous <Continuous>    CULTURES:    RADIOLOGY & ADDITIONAL TESTS:    ASSESSMENT:  68y Female s/p L2-3 and L3-4 laminectomy, POD #3    M47.16  Handoff  MEWS Score  MERYL on CPAP  Hypothyroidism  Lung nodule  Osteoarthritis of knee  GERD (gastroesophageal reflux disease)  Hypertension  Asthma  Peripheral venous insufficiency  Hypercholesterolemia  MERYL (obstructive sleep apnea)  Aortic valve disorder  Vitamin D deficiency  Recurrent urinary tract infection  Lumbar spondylosis  Lumbar stenosis  Lumbar stenosis  Lumbar spondylosis  Lumbar laminectomy  History of pubovaginal sling  H/O arthroscopy of left knee  S/P rotator cuff repair  H/O carpal tunnel repair  History of total hysterectomy  H/O vascular surgery  History of cholecystectomy  H/O arthroscopy of right knee      PLAN:  -spinal checks  -abdominal binder for comfort  -tylenol prn pain, NS at 60cc/hr   -f/u blood cultures  -HOB 30 degrees, defer PT for now  -keep hernandez for now  -Advance diet as tolerated  -DVT prophylaxis: SCDs, SQL  -pain control: fioricet, oxycodone, dilaudid IV prn  -continue home medications: zocor, synthroid, advair  -bowel regimen  -Dispo pending  -D/w Dr. Figueroa HPI:  Pt is 67yo female with Lower back pain and left leg pain, here for an elective surgery with Dr. Figueroa (2018 09:57)    OVERNIGHT EVENTS: Overnight spiked fever, given tylenol. UA neg. Denies acute weakness/loss of sensation of extremities.    Vital Signs Last 24 Hrs  T(C): 37.8 (2018 04:49), Max: 38.6 (2018 18:45)  T(F): 100 (2018 04:49), Max: 101.5 (2018 18:45)  HR: 84 (2018 06:43) (61 - 98)  BP: 134/58 (2018 04:49) (119/56 - 147/67)  BP(mean): 80 (2018 23:41) (80 - 89)  RR: 16 (2018 04:49) (14 - 20)  SpO2: 94% (2018 06:43) (92% - 97%)    I&O's Summary    2018 07:01  -  2018 07:00  --------------------------------------------------------  IN: 1700 mL / OUT: 1300 mL / NET: 400 mL      PHYSICAL EXAM:  Neurological: AAOx3, FC, speech coherent  CNII-XII: EOM intact, PERRL, face symmetric  Motor: MAEx4 5/5 UE and LE B/L  SILT throughout  Incision/Wound: Posterior lumbar incision site C/D/I    TUBES/LINES:  [] Hernandez  [] Lumbar Drain  [] Wound Drains  [] Others    DIET:  [] NPO  [x Mechanical  [] Tube feeds    LABS:                        11.7   13.1  )-----------( 276      ( 2018 06:27 )             37.0     07-23    136  |  98  |  10  ----------------------------<  117<H>  3.7   |  26  |  0.52    Ca    8.3<L>      2018 06:27  Phos  3.0     07-23  Mg     2.0     07-23        Urinalysis Basic - ( 2018 19:29 )    Color: Yellow / Appearance: Clear / S.010 / pH: x  Gluc: x / Ketone: NEGATIVE  / Bili: Negative / Urobili: 0.2 E.U./dL   Blood: x / Protein: 30 mg/dL / Nitrite: NEGATIVE   Leuk Esterase: NEGATIVE / RBC: < 5 /HPF / WBC 5-10 /HPF   Sq Epi: x / Non Sq Epi: 5-10 /HPF / Bacteria: Present /HPF          CAPILLARY BLOOD GLUCOSE          Drug Levels: [] N/A    CSF Analysis: [] N/A      Allergies    Ceclor (Swelling)  Flagyl (Rash)  gabapentin (Rash)  lidocaine (Headache)  tramadol (Rash)    Intolerances      MEDICATIONS:  Antibiotics:    Neuro:  acetaminophen   Tablet 650 milliGRAM(s) Oral every 6 hours PRN  acetaminophen 300 mG/butalbital 50 mG/ caffeine 40 mG 1 Capsule(s) Oral every 6 hours PRN  HYDROmorphone  Injectable 0.5 milliGRAM(s) IV Push once PRN  ondansetron Injectable 4 milliGRAM(s) IV Push every 6 hours PRN  oxyCODONE    IR 5 milliGRAM(s) Oral every 6 hours PRN  oxyCODONE    IR 10 milliGRAM(s) Oral every 6 hours PRN    Anticoagulation:  enoxaparin Injectable 40 milliGRAM(s) SubCutaneous at bedtime    OTHER:  ALBUTerol    90 MICROgram(s) HFA Inhaler 2 Puff(s) Inhalation two times a day PRN  buDESOnide 160 MICROgram(s)/formoterol 4.5 MICROgram(s) Inhaler 2 Puff(s) Inhalation two times a day  docusate sodium 100 milliGRAM(s) Oral three times a day  levothyroxine 25 MICROGram(s) Oral daily  pantoprazole    Tablet 40 milliGRAM(s) Oral daily  senna 2 Tablet(s) Oral at bedtime PRN  simethicone 80 milliGRAM(s) Chew two times a day PRN  simvastatin 20 milliGRAM(s) Oral at bedtime    IVF:  sodium chloride 0.9%. 1000 milliLiter(s) IV Continuous <Continuous>    CULTURES:    RADIOLOGY & ADDITIONAL TESTS:    ASSESSMENT:  68y Female s/p L2-3 and L3-4 laminectomy, POD #3    M47.16  Handoff  MEWS Score  MERYL on CPAP  Hypothyroidism  Lung nodule  Osteoarthritis of knee  GERD (gastroesophageal reflux disease)  Hypertension  Asthma  Peripheral venous insufficiency  Hypercholesterolemia  MERYL (obstructive sleep apnea)  Aortic valve disorder  Vitamin D deficiency  Recurrent urinary tract infection  Lumbar spondylosis  Lumbar stenosis  Lumbar stenosis  Lumbar spondylosis  Lumbar laminectomy  History of pubovaginal sling  H/O arthroscopy of left knee  S/P rotator cuff repair  H/O carpal tunnel repair  History of total hysterectomy  H/O vascular surgery  History of cholecystectomy  H/O arthroscopy of right knee      PLAN:  -spinal checks  -abdominal binder for comfort  -tylenol prn pain, NS at 60cc/hr   -f/u blood cultures  -can be OOB to bathroom privelages, bedrest as much as possible per Dr. Figueroa  -keep hernandez for now  -Advance diet as tolerated  -DVT prophylaxis: SCDs, SQL  -pain control: fioricet, oxycodone, dilaudid IV prn  -continue home medications: zocor, synthroid, advair  -bowel regimen  -Dispo pending  -D/w Dr. Figueroa

## 2018-07-23 NOTE — CONSULT NOTE ADULT - PROBLEM SELECTOR RECOMMENDATION 9
Most likely related to atelectasis. My concern her pulmonary status and underlying pulmonary infection. Follow up on culture. Start the patient on IV antibiotic and observe. No clinical evidence of meningitis

## 2018-07-24 LAB
ANION GAP SERPL CALC-SCNC: 14 MMOL/L — SIGNIFICANT CHANGE UP (ref 5–17)
BUN SERPL-MCNC: 12 MG/DL — SIGNIFICANT CHANGE UP (ref 7–23)
CALCIUM SERPL-MCNC: 8.4 MG/DL — SIGNIFICANT CHANGE UP (ref 8.4–10.5)
CHLORIDE SERPL-SCNC: 99 MMOL/L — SIGNIFICANT CHANGE UP (ref 96–108)
CO2 SERPL-SCNC: 27 MMOL/L — SIGNIFICANT CHANGE UP (ref 22–31)
CREAT SERPL-MCNC: 0.64 MG/DL — SIGNIFICANT CHANGE UP (ref 0.5–1.3)
GLUCOSE SERPL-MCNC: 105 MG/DL — HIGH (ref 70–99)
HCT VFR BLD CALC: 35.9 % — SIGNIFICANT CHANGE UP (ref 34.5–45)
HGB BLD-MCNC: 11 G/DL — LOW (ref 11.5–15.5)
MAGNESIUM SERPL-MCNC: 2 MG/DL — SIGNIFICANT CHANGE UP (ref 1.6–2.6)
MCHC RBC-ENTMCNC: 25.5 PG — LOW (ref 27–34)
MCHC RBC-ENTMCNC: 30.6 G/DL — LOW (ref 32–36)
MCV RBC AUTO: 83.3 FL — SIGNIFICANT CHANGE UP (ref 80–100)
PHOSPHATE SERPL-MCNC: 4 MG/DL — SIGNIFICANT CHANGE UP (ref 2.5–4.5)
PLATELET # BLD AUTO: 299 K/UL — SIGNIFICANT CHANGE UP (ref 150–400)
POTASSIUM SERPL-MCNC: 3.4 MMOL/L — LOW (ref 3.5–5.3)
POTASSIUM SERPL-SCNC: 3.4 MMOL/L — LOW (ref 3.5–5.3)
RBC # BLD: 4.31 M/UL — SIGNIFICANT CHANGE UP (ref 3.8–5.2)
RBC # FLD: 14.8 % — SIGNIFICANT CHANGE UP (ref 10.3–16.9)
SODIUM SERPL-SCNC: 140 MMOL/L — SIGNIFICANT CHANGE UP (ref 135–145)
WBC # BLD: 10.6 K/UL — HIGH (ref 3.8–10.5)
WBC # FLD AUTO: 10.6 K/UL — HIGH (ref 3.8–10.5)

## 2018-07-24 PROCEDURE — 99232 SBSQ HOSP IP/OBS MODERATE 35: CPT | Mod: GC

## 2018-07-24 RX ORDER — OXYCODONE HYDROCHLORIDE 5 MG/1
10 TABLET ORAL EVERY 4 HOURS
Qty: 0 | Refills: 0 | Status: DISCONTINUED | OUTPATIENT
Start: 2018-07-24 | End: 2018-07-26

## 2018-07-24 RX ORDER — MULTIVIT WITH MIN/MFOLATE/K2 340-15/3 G
250 POWDER (GRAM) ORAL ONCE
Qty: 0 | Refills: 0 | Status: COMPLETED | OUTPATIENT
Start: 2018-07-24 | End: 2018-07-24

## 2018-07-24 RX ORDER — OXYCODONE HYDROCHLORIDE 5 MG/1
5 TABLET ORAL EVERY 4 HOURS
Qty: 0 | Refills: 0 | Status: DISCONTINUED | OUTPATIENT
Start: 2018-07-24 | End: 2018-07-26

## 2018-07-24 RX ORDER — POTASSIUM CHLORIDE 20 MEQ
40 PACKET (EA) ORAL EVERY 4 HOURS
Qty: 0 | Refills: 0 | Status: COMPLETED | OUTPATIENT
Start: 2018-07-24 | End: 2018-07-24

## 2018-07-24 RX ADMIN — SIMVASTATIN 20 MILLIGRAM(S): 20 TABLET, FILM COATED ORAL at 21:19

## 2018-07-24 RX ADMIN — Medication 40 MILLIEQUIVALENT(S): at 14:59

## 2018-07-24 RX ADMIN — OXYCODONE HYDROCHLORIDE 10 MILLIGRAM(S): 5 TABLET ORAL at 12:06

## 2018-07-24 RX ADMIN — OXYCODONE HYDROCHLORIDE 10 MILLIGRAM(S): 5 TABLET ORAL at 23:00

## 2018-07-24 RX ADMIN — Medication 750 MILLIGRAM(S): at 22:13

## 2018-07-24 RX ADMIN — BUDESONIDE AND FORMOTEROL FUMARATE DIHYDRATE 2 PUFF(S): 160; 4.5 AEROSOL RESPIRATORY (INHALATION) at 05:21

## 2018-07-24 RX ADMIN — OXYCODONE HYDROCHLORIDE 10 MILLIGRAM(S): 5 TABLET ORAL at 17:58

## 2018-07-24 RX ADMIN — Medication 40 MILLIEQUIVALENT(S): at 11:10

## 2018-07-24 RX ADMIN — Medication 100 MILLIGRAM(S): at 05:21

## 2018-07-24 RX ADMIN — Medication 10 MILLIGRAM(S): at 11:06

## 2018-07-24 RX ADMIN — OXYCODONE HYDROCHLORIDE 10 MILLIGRAM(S): 5 TABLET ORAL at 07:18

## 2018-07-24 RX ADMIN — Medication 5 MILLIGRAM(S): at 11:06

## 2018-07-24 RX ADMIN — ENOXAPARIN SODIUM 40 MILLIGRAM(S): 100 INJECTION SUBCUTANEOUS at 21:20

## 2018-07-24 RX ADMIN — Medication 250 MILLILITER(S): at 14:11

## 2018-07-24 RX ADMIN — BUDESONIDE AND FORMOTEROL FUMARATE DIHYDRATE 2 PUFF(S): 160; 4.5 AEROSOL RESPIRATORY (INHALATION) at 18:00

## 2018-07-24 RX ADMIN — PANTOPRAZOLE SODIUM 40 MILLIGRAM(S): 20 TABLET, DELAYED RELEASE ORAL at 09:30

## 2018-07-24 RX ADMIN — OXYCODONE HYDROCHLORIDE 10 MILLIGRAM(S): 5 TABLET ORAL at 05:20

## 2018-07-24 RX ADMIN — Medication 100 MILLIGRAM(S): at 21:19

## 2018-07-24 RX ADMIN — OXYCODONE HYDROCHLORIDE 10 MILLIGRAM(S): 5 TABLET ORAL at 22:14

## 2018-07-24 RX ADMIN — Medication 25 MICROGRAM(S): at 05:21

## 2018-07-24 RX ADMIN — Medication 750 MILLIGRAM(S): at 11:07

## 2018-07-24 RX ADMIN — Medication 650 MILLIGRAM(S): at 17:58

## 2018-07-24 RX ADMIN — Medication 1 CAPSULE(S): at 02:20

## 2018-07-24 RX ADMIN — OXYCODONE HYDROCHLORIDE 10 MILLIGRAM(S): 5 TABLET ORAL at 11:06

## 2018-07-24 RX ADMIN — Medication 100 MILLIGRAM(S): at 14:11

## 2018-07-24 RX ADMIN — Medication 1 CAPSULE(S): at 03:30

## 2018-07-24 RX ADMIN — OXYCODONE HYDROCHLORIDE 10 MILLIGRAM(S): 5 TABLET ORAL at 18:58

## 2018-07-24 NOTE — OCCUPATIONAL THERAPY INITIAL EVALUATION ADULT - PERTINENT HX OF CURRENT PROBLEM, REHAB EVAL
67yo female with Lower back pain and left leg pain, here for an elective surgery with Dr. Figueroa. S/P left L2-L3, L3-L4 lumbar laminectomy, intraop CSF leak repaired with muscle tissue and dura seal 7/20.

## 2018-07-24 NOTE — OCCUPATIONAL THERAPY INITIAL EVALUATION ADULT - GENERAL OBSERVATIONS, REHAB EVAL
Right hand dominant. Patient cleared for Occupational Therapy by SUKI Luna, patient received seated in B/S chair in non-acute distress, +Tele, +IV heplock, +lumbar dressing C/D/I, + right knee acewrap C/D/I. Patient reports chronic right knee pain and lumbar discomfort, no other complaint.

## 2018-07-24 NOTE — PROGRESS NOTE ADULT - SUBJECTIVE AND OBJECTIVE BOX
HPI:  Pt is 69yo female with Lower back pain and left leg pain, here for an elective surgery with Dr. Figueroa (2018 09:57)    OVERNIGHT EVENTS:  Vital Signs Last 24 Hrs  T(C): 37.2 (2018 08:32), Max: 38.1 (2018 13:43)  T(F): 99 (2018 08:32), Max: 100.6 (2018 13:43)  HR: 92 (2018 09:11) (76 - 105)  BP: 127/62 (2018 08:32) (117/53 - 140/65)  BP(mean): 76 (2018 05:01) (76 - 94)  RR: 18 (2018 09:11) (17 - 20)  SpO2: 95% (2018 09:11) (92% - 97%)    I&O's Summary    2018 07:01  -  2018 07:00  --------------------------------------------------------  IN: 2040 mL / OUT: 2300 mL / NET: -260 mL        PHYSICAL EXAM:  Neurological:  Neurological: AAOx3, FC, speech coherent  CNII-XII: EOM intact, PERRL, face symmetric  Motor: MAEx4 5/5 UE and LE B/L  SILT throughout  Incision/Wound: Posterior lumbar incision site C/D/I      Cardiovascular: RRR  Respiratory: Lungs CTAB  Gastrointestinal: +BS  Genitourinary: voiding without difficulty  Extremities: warm and dry  Incision/Wound: CDi    DIET: Regular      LABS:                        11.0   10.6  )-----------( 299      ( 2018 07:53 )             35.9     07-24    140  |  99  |  12  ----------------------------<  105<H>  3.4<L>   |  27  |  0.64    Ca    8.4      2018 07:53  Phos  4.0     07-24  Mg     2.0     07-24        Urinalysis Basic - ( 2018 19:29 )    Color: Yellow / Appearance: Clear / S.010 / pH: x  Gluc: x / Ketone: NEGATIVE  / Bili: Negative / Urobili: 0.2 E.U./dL   Blood: x / Protein: 30 mg/dL / Nitrite: NEGATIVE   Leuk Esterase: NEGATIVE / RBC: < 5 /HPF / WBC 5-10 /HPF   Sq Epi: x / Non Sq Epi: 5-10 /HPF / Bacteria: Present /HPF    CAPILLARY BLOOD GLUCOSE    Drug Levels: [] N/A    CSF Analysis: [] N/A      Allergies    Ceclor (Swelling)  Flagyl (Rash)  gabapentin (Rash)  lidocaine (Headache)  tramadol (Rash)    Intolerances      MEDICATIONS:  Antibiotics:  ciprofloxacin     Tablet 750 milliGRAM(s) Oral every 12 hours    Neuro:  acetaminophen   Tablet 650 milliGRAM(s) Oral every 6 hours PRN  acetaminophen 300 mG/butalbital 50 mG/ caffeine 40 mG 1 Capsule(s) Oral every 6 hours PRN  ondansetron Injectable 4 milliGRAM(s) IV Push every 6 hours PRN  oxyCODONE    IR 5 milliGRAM(s) Oral every 6 hours PRN  oxyCODONE    IR 10 milliGRAM(s) Oral every 6 hours PRN    Anticoagulation:  enoxaparin Injectable 40 milliGRAM(s) SubCutaneous at bedtime    OTHER:  ALBUTerol    90 MICROgram(s) HFA Inhaler 2 Puff(s) Inhalation two times a day PRN  buDESOnide 160 MICROgram(s)/formoterol 4.5 MICROgram(s) Inhaler 2 Puff(s) Inhalation two times a day  docusate sodium 100 milliGRAM(s) Oral three times a day  levothyroxine 25 MICROGram(s) Oral daily  pantoprazole    Tablet 40 milliGRAM(s) Oral daily  senna 2 Tablet(s) Oral at bedtime PRN  simethicone 80 milliGRAM(s) Chew two times a day PRN  simvastatin 20 milliGRAM(s) Oral at bedtime    IVF:  sodium chloride 0.9%. 1000 milliLiter(s) IV Continuous <Continuous>    CULTURES:  Culture Results:   No growth at 1 day. ( @ 20:52)    RADIOLOGY & ADDITIONAL TESTS:      ASSESSMENT:  68y Female s/p L2-3 L3-4 laminectomy  Hypokalemia    PLAN:    NEURO:    Monitor neuro status  OT/PT  Pain Managment  Bowel regime  supplement Potassium  F/U AM labs  Continue current medical regime    Dispo: Discussed with attending

## 2018-07-24 NOTE — PROGRESS NOTE ADULT - SUBJECTIVE AND OBJECTIVE BOX
Interval Events: Reviewed  Patient seen and examined at bedside.    Patient is a 68y old  Female who presents with a chief complaint of Lower back pain and LLE pain (2018 09:57)  She still want to get out of bed. States she feel congested in the chest.    PAST MEDICAL & SURGICAL HISTORY:  MERYL on CPAP  Hypothyroidism  Osteoarthritis of knee  GERD (gastroesophageal reflux disease)  Asthma  Peripheral venous insufficiency  Hypercholesterolemia  MERYL (obstructive sleep apnea): syndrome  Aortic valve disorder  Vitamin D deficiency  Recurrent urinary tract infection  Lumbar spondylosis  History of pubovaginal sling  H/O arthroscopy of left knee  S/P rotator cuff repair: right  H/O carpal tunnel repair: left  History of total hysterectomy  H/O vascular surgery: left lower extremity  History of cholecystectomy  H/O arthroscopy of right knee: x2      MEDICATIONS:  Pulmonary:  ALBUTerol    90 MICROgram(s) HFA Inhaler 2 Puff(s) Inhalation two times a day PRN  buDESOnide 160 MICROgram(s)/formoterol 4.5 MICROgram(s) Inhaler 2 Puff(s) Inhalation two times a day    Antimicrobials:  ciprofloxacin     Tablet 750 milliGRAM(s) Oral every 12 hours    Anticoagulants:  enoxaparin Injectable 40 milliGRAM(s) SubCutaneous at bedtime    Cardiac:      Allergies    Ceclor (Swelling)  Flagyl (Rash)  gabapentin (Rash)  lidocaine (Headache)  tramadol (Rash)    Intolerances        Vital Signs Last 24 Hrs  T(C): 37.2 (2018 08:32), Max: 38.1 (2018 13:43)  T(F): 99 (2018 08:32), Max: 100.6 (2018 13:43)  HR: 92 (2018 09:11) (76 - 105)  BP: 127/62 (2018 08:32) (117/53 - 140/65)  BP(mean): 76 (2018 05:01) (76 - 94)  RR: 18 (2018 09:11) (17 - 20)  SpO2: 95% (2018 09:11) (92% - 97%)     @ 07:01  -   @ 07:00  --------------------------------------------------------  IN: 2040 mL / OUT: 2300 mL / NET: -260 mL          LABS:      CBC Full  -  ( 2018 07:53 )  WBC Count : 10.6 K/uL  Hemoglobin : 11.0 g/dL  Hematocrit : 35.9 %  Platelet Count - Automated : 299 K/uL  Mean Cell Volume : 83.3 fL  Mean Cell Hemoglobin : 25.5 pg  Mean Cell Hemoglobin Concentration : 30.6 g/dL  Auto Neutrophil # : x  Auto Lymphocyte # : x  Auto Monocyte # : x  Auto Eosinophil # : x  Auto Basophil # : x  Auto Neutrophil % : x  Auto Lymphocyte % : x  Auto Monocyte % : x  Auto Eosinophil % : x  Auto Basophil % : x        140  |  99  |  12  ----------------------------<  105<H>  3.4<L>   |  27  |  0.64    Ca    8.4      2018 07:53  Phos  4.0     -  Mg     2.0                 Urinalysis Basic - ( 2018 19:29 )    Color: Yellow / Appearance: Clear / S.010 / pH: x  Gluc: x / Ketone: NEGATIVE  / Bili: Negative / Urobili: 0.2 E.U./dL   Blood: x / Protein: 30 mg/dL / Nitrite: NEGATIVE   Leuk Esterase: NEGATIVE / RBC: < 5 /HPF / WBC 5-10 /HPF   Sq Epi: x / Non Sq Epi: 5-10 /HPF / Bacteria: Present /HPF              Culture Results:   No growth at 1 day. ( @ 20:52)      RADIOLOGY & ADDITIONAL STUDIES (The following images were personally reviewed):  Coats:                            yes  Urine output:                       adequate  DVT prophylaxis:                 Yes  Flattus:                                  Yes  Bowel movement:              No

## 2018-07-24 NOTE — OCCUPATIONAL THERAPY INITIAL EVALUATION ADULT - PLANNED THERAPY INTERVENTIONS, OT EVAL
balance training/IADL retraining/ADL retraining/bed mobility training/parent/caregiver training.../transfer training

## 2018-07-25 LAB
ANION GAP SERPL CALC-SCNC: 12 MMOL/L — SIGNIFICANT CHANGE UP (ref 5–17)
BUN SERPL-MCNC: 15 MG/DL — SIGNIFICANT CHANGE UP (ref 7–23)
CALCIUM SERPL-MCNC: 8.7 MG/DL — SIGNIFICANT CHANGE UP (ref 8.4–10.5)
CHLORIDE SERPL-SCNC: 102 MMOL/L — SIGNIFICANT CHANGE UP (ref 96–108)
CO2 SERPL-SCNC: 27 MMOL/L — SIGNIFICANT CHANGE UP (ref 22–31)
CREAT SERPL-MCNC: 0.6 MG/DL — SIGNIFICANT CHANGE UP (ref 0.5–1.3)
GLUCOSE SERPL-MCNC: 121 MG/DL — HIGH (ref 70–99)
HCT VFR BLD CALC: 35.6 % — SIGNIFICANT CHANGE UP (ref 34.5–45)
HGB BLD-MCNC: 11.4 G/DL — LOW (ref 11.5–15.5)
MCHC RBC-ENTMCNC: 26.3 PG — LOW (ref 27–34)
MCHC RBC-ENTMCNC: 32 G/DL — SIGNIFICANT CHANGE UP (ref 32–36)
MCV RBC AUTO: 82.2 FL — SIGNIFICANT CHANGE UP (ref 80–100)
PLATELET # BLD AUTO: 364 K/UL — SIGNIFICANT CHANGE UP (ref 150–400)
POTASSIUM SERPL-MCNC: 4.5 MMOL/L — SIGNIFICANT CHANGE UP (ref 3.5–5.3)
POTASSIUM SERPL-SCNC: 4.5 MMOL/L — SIGNIFICANT CHANGE UP (ref 3.5–5.3)
RBC # BLD: 4.33 M/UL — SIGNIFICANT CHANGE UP (ref 3.8–5.2)
RBC # FLD: 14.9 % — SIGNIFICANT CHANGE UP (ref 10.3–16.9)
SODIUM SERPL-SCNC: 141 MMOL/L — SIGNIFICANT CHANGE UP (ref 135–145)
WBC # BLD: 9.7 K/UL — SIGNIFICANT CHANGE UP (ref 3.8–10.5)
WBC # FLD AUTO: 9.7 K/UL — SIGNIFICANT CHANGE UP (ref 3.8–10.5)

## 2018-07-25 PROCEDURE — 99232 SBSQ HOSP IP/OBS MODERATE 35: CPT | Mod: GC

## 2018-07-25 RX ADMIN — OXYCODONE HYDROCHLORIDE 10 MILLIGRAM(S): 5 TABLET ORAL at 15:41

## 2018-07-25 RX ADMIN — Medication 750 MILLIGRAM(S): at 14:55

## 2018-07-25 RX ADMIN — PANTOPRAZOLE SODIUM 40 MILLIGRAM(S): 20 TABLET, DELAYED RELEASE ORAL at 14:55

## 2018-07-25 RX ADMIN — OXYCODONE HYDROCHLORIDE 10 MILLIGRAM(S): 5 TABLET ORAL at 21:55

## 2018-07-25 RX ADMIN — OXYCODONE HYDROCHLORIDE 10 MILLIGRAM(S): 5 TABLET ORAL at 07:10

## 2018-07-25 RX ADMIN — OXYCODONE HYDROCHLORIDE 10 MILLIGRAM(S): 5 TABLET ORAL at 06:21

## 2018-07-25 RX ADMIN — BUDESONIDE AND FORMOTEROL FUMARATE DIHYDRATE 2 PUFF(S): 160; 4.5 AEROSOL RESPIRATORY (INHALATION) at 06:20

## 2018-07-25 RX ADMIN — Medication 100 MILLIGRAM(S): at 14:55

## 2018-07-25 RX ADMIN — SIMVASTATIN 20 MILLIGRAM(S): 20 TABLET, FILM COATED ORAL at 21:56

## 2018-07-25 RX ADMIN — OXYCODONE HYDROCHLORIDE 10 MILLIGRAM(S): 5 TABLET ORAL at 16:30

## 2018-07-25 RX ADMIN — Medication 100 MILLIGRAM(S): at 06:20

## 2018-07-25 RX ADMIN — ENOXAPARIN SODIUM 40 MILLIGRAM(S): 100 INJECTION SUBCUTANEOUS at 21:56

## 2018-07-25 RX ADMIN — Medication 25 MICROGRAM(S): at 06:20

## 2018-07-25 RX ADMIN — OXYCODONE HYDROCHLORIDE 10 MILLIGRAM(S): 5 TABLET ORAL at 22:50

## 2018-07-25 NOTE — PROGRESS NOTE ADULT - SUBJECTIVE AND OBJECTIVE BOX
Subjective: Seen/evaluated at bedside.      T(C): 36.7 (07-25-18 @ 04:51), Max: 38.1 (07-24-18 @ 17:53)  HR: 96 (07-25-18 @ 06:02) (77 - 110)  BP: 125/58 (07-25-18 @ 04:51) (102/45 - 133/53)  RR: 16 (07-25-18 @ 06:02) (16 - 19)  SpO2: 96% (07-25-18 @ 06:02) (94% - 99%)  Wt(kg): --    Exam:  A/Ox3, follows commands, speech clear  BAZAN 5/5 strength  Sensation intact light touch all dermatomes    CBC Full  -  ( 24 Jul 2018 07:53 )  WBC Count : 10.6 K/uL  Hemoglobin : 11.0 g/dL  Hematocrit : 35.9 %  Platelet Count - Automated : 299 K/uL  Mean Cell Volume : 83.3 fL  Mean Cell Hemoglobin : 25.5 pg  Mean Cell Hemoglobin Concentration : 30.6 g/dL  Auto Neutrophil # : x  Auto Lymphocyte # : x  Auto Monocyte # : x  Auto Eosinophil # : x  Auto Basophil # : x  Auto Neutrophil % : x  Auto Lymphocyte % : x  Auto Monocyte % : x  Auto Eosinophil % : x  Auto Basophil % : x    07-24    140  |  99  |  12  ----------------------------<  105<H>  3.4<L>   |  27  |  0.64    Ca    8.4      24 Jul 2018 07:53  Phos  4.0     07-24  Mg     2.0     07-24            Wound: C/D/I, +dermabond    Assessment/Plan:  -PT/bathroom privileges  -Neuro checks  -Pain control  -Monitor S/S CSF leak  -DVT/GI pp  -Dispo planning - when discharged home 1 week bathroom privileges only  -D/W Dr. Figueroa

## 2018-07-25 NOTE — DISCHARGE NOTE ADULT - NS AS ACTIVITY OBS
Do not make important decisions/Do not drive or operate machinery/No Heavy lifting/straining/Stairs allowed/Driving allowed/Walking-Indoors allowed

## 2018-07-25 NOTE — PROGRESS NOTE ADULT - SUBJECTIVE AND OBJECTIVE BOX
Interval Events: Reviewed  Patient seen and examined at bedside.    Patient is a 68y old  Female who presents with a chief complaint of Lower back pain and LLE pain (25 Jul 2018 13:49)    Feeling that of a better was she was out of bed today.  PAST MEDICAL & SURGICAL HISTORY:  MERYL on CPAP  Hypothyroidism  Osteoarthritis of knee  GERD (gastroesophageal reflux disease)  Asthma  Peripheral venous insufficiency  Hypercholesterolemia  MERYL (obstructive sleep apnea): syndrome  Aortic valve disorder  Vitamin D deficiency  Recurrent urinary tract infection  Lumbar spondylosis  History of pubovaginal sling  H/O arthroscopy of left knee  S/P rotator cuff repair: right  H/O carpal tunnel repair: left  History of total hysterectomy  H/O vascular surgery: left lower extremity  History of cholecystectomy  H/O arthroscopy of right knee: x2      MEDICATIONS:  Pulmonary:  ALBUTerol    90 MICROgram(s) HFA Inhaler 2 Puff(s) Inhalation two times a day PRN  buDESOnide 160 MICROgram(s)/formoterol 4.5 MICROgram(s) Inhaler 2 Puff(s) Inhalation two times a day    Antimicrobials:  ciprofloxacin     Tablet 750 milliGRAM(s) Oral every 12 hours    Anticoagulants:  enoxaparin Injectable 40 milliGRAM(s) SubCutaneous at bedtime    Cardiac:      Allergies    Ceclor (Swelling)  Flagyl (Rash)  gabapentin (Rash)  lidocaine (Headache)  tramadol (Rash)    Intolerances        Vital Signs Last 24 Hrs  T(C): 37.3 (25 Jul 2018 20:58), Max: 37.3 (25 Jul 2018 20:58)  T(F): 99.1 (25 Jul 2018 20:58), Max: 99.1 (25 Jul 2018 20:58)  HR: 90 (25 Jul 2018 20:58) (77 - 102)  BP: 155/73 (25 Jul 2018 20:58) (102/45 - 155/73)  BP(mean): --  RR: 17 (25 Jul 2018 20:58) (16 - 19)  SpO2: 95% (25 Jul 2018 20:58) (92% - 99%)    07-24 @ 07:01  -  07-25 @ 07:00  --------------------------------------------------------  IN: 540 mL / OUT: 1100 mL / NET: -560 mL          LABS:      CBC Full  -  ( 25 Jul 2018 07:11 )  WBC Count : 9.7 K/uL  Hemoglobin : 11.4 g/dL  Hematocrit : 35.6 %  Platelet Count - Automated : 364 K/uL  Mean Cell Volume : 82.2 fL  Mean Cell Hemoglobin : 26.3 pg  Mean Cell Hemoglobin Concentration : 32.0 g/dL  Auto Neutrophil # : x  Auto Lymphocyte # : x  Auto Monocyte # : x  Auto Eosinophil # : x  Auto Basophil # : x  Auto Neutrophil % : x  Auto Lymphocyte % : x  Auto Monocyte % : x  Auto Eosinophil % : x  Auto Basophil % : x    07-25    141  |  102  |  15  ----------------------------<  121<H>  4.5   |  27  |  0.60    Ca    8.7      25 Jul 2018 07:11  Phos  4.0     07-24  Mg     2.0     07-24                          RADIOLOGY & ADDITIONAL STUDIES (The following images were personally reviewed):  Coats:                                     No  Urine output:                       adequate  DVT prophylaxis:                 Yes  Flattus:                                  Yes  Bowel movement:              No

## 2018-07-25 NOTE — DISCHARGE NOTE ADULT - MEDICATION SUMMARY - MEDICATIONS TO TAKE
I will START or STAY ON the medications listed below when I get home from the hospital:    acetaminophen 325 mg oral tablet  -- 2 tab(s) by mouth every 6 hours, As needed, For Temp greater than 38 C (100.4 F)  -- Indication: For Pain    butalbital/acetaminophen/caffeine 50 mg-300 mg-40 mg oral capsule  -- 1 cap(s) by mouth every 6 hours, As needed, headache MDD:4  -- Indication: For Moderate pain    Zocor 20 mg oral tablet  -- 1 tab(s) by mouth once a day (at bedtime)  -- Indication: For cholesterol    Advair Diskus 250 mcg-50 mcg inhalation powder  -- 1 puff(s) inhaled 2 times a day  -- Indication: For inhalant    ProAir HFA 90 mcg/inh inhalation aerosol  -- 2 puff(s) inhaled once a day  -- Indication: For inhalant    bisacodyl 5 mg oral delayed release tablet  -- 1 tab(s) by mouth every 12 hours, As needed, Constipation  -- Indication: For Stool osftner    bisacodyl 10 mg rectal suppository  -- 1 suppository(ies) rectally once a day, As needed, Constipation  -- Indication: For Suppositry    docusate sodium 100 mg oral capsule  -- 1 cap(s) by mouth 3 times a day  -- Indication: For Stool softner    senna oral tablet  -- 2 tab(s) by mouth once a day (at bedtime), As needed, Constipation  -- Indication: For Fiber pill    NexIUM 40 mg oral delayed release capsule  -- 1 cap(s) by mouth once a day  -- Indication: For Antacid    ciprofloxacin 750 mg oral tablet  -- 1 tab(s) by mouth every 12 hours  -- Indication: For Antibx until 7/30    Synthroid 25 mcg (0.025 mg) oral tablet  -- 1 tab(s) by mouth once a day  -- Indication: For thyroid

## 2018-07-25 NOTE — PROGRESS NOTE ADULT - ASSESSMENT
Assessment: 68y Female s/p lumbar laminectomy POD#5 with new complaint of left lower extremity pain    Recommendations:  -   - discussed with Chief on call  - call x [phone] with questions Assessment: 68y Female s/p lumbar laminectomy POD#5 with new complaint of left lower extremity pain, bilateral lower extremities swollen with no signs of cellulitis     Recommendations:  - ace bandages applied to bilateral lower extremities  - discussed with Chief on call  - call x [phone] with questions

## 2018-07-25 NOTE — DISCHARGE NOTE ADULT - CARE PLAN
Principal Discharge DX:	Lumbar spondylosis  Goal:	return home and regain your independent activity  Assessment and plan of treatment:	Diet: Regular  Activity as tolerated  No heavy lifting anything greater than 10 pounds  No bending or lifting  Limit your activity until you follow up with Dr Figueroa on Friday 8/10  Call for an appointment  Any increase in headache temperature greater than 101.5 degrees F call the office

## 2018-07-25 NOTE — PROGRESS NOTE ADULT - SUBJECTIVE AND OBJECTIVE BOX
Attending:  Clinton    HPI:  69 yo F with PMH: HLD, hypothyroidism, varicose veins s/p left GSV RFA in 2007, lumbar stenosis now s/p lumbar laminectomy of Left L2-L3 and L3-L4 on 7/20 now POD#5. Patient now complains of left lower extremity pain which she describes as constant and is pins and needle like. Patient reports that the pain begins in the middle of her calf and extends to her heel on the posterior side of the left lower extremity. The patient reports that she is able to ambulate and that ambulation does not make the pain worse.    Meds:  Allerg: ceclor, flagyl, gabapentin, lidocane, tramadol    PAST MEDICAL & SURGICAL HISTORY:  MERYL on CPAP  Hypothyroidism  Osteoarthritis of knee  GERD (gastroesophageal reflux disease)  Asthma  Peripheral venous insufficiency  Hypercholesterolemia  MERYL (obstructive sleep apnea): syndrome  Aortic valve disorder  Vitamin D deficiency  Recurrent urinary tract infection  Lumbar spondylosis s/p lumbar laminectomy  History of pubovaginal sling  H/O arthroscopy of left knee  S/P rotator cuff repair: right  H/O carpal tunnel repair: left  History of total hysterectomy  H/O vascular surgery: left lower extremity left GSV RFA in 2007  History of cholecystectomy  H/O arthroscopy of right knee: x2      MEDICATIONS  (STANDING):  buDESOnide 160 MICROgram(s)/formoterol 4.5 MICROgram(s) Inhaler 2 Puff(s) Inhalation two times a day  ciprofloxacin     Tablet 750 milliGRAM(s) Oral every 12 hours  docusate sodium 100 milliGRAM(s) Oral three times a day  enoxaparin Injectable 40 milliGRAM(s) SubCutaneous at bedtime  levothyroxine 25 MICROGram(s) Oral daily  pantoprazole    Tablet 40 milliGRAM(s) Oral daily  simvastatin 20 milliGRAM(s) Oral at bedtime  sodium chloride 0.9%. 1000 milliLiter(s) (60 mL/Hr) IV Continuous <Continuous>    MEDICATIONS  (PRN):  acetaminophen   Tablet 650 milliGRAM(s) Oral every 6 hours PRN For Temp greater than 38 C (100.4 F)  acetaminophen 300 mG/butalbital 50 mG/ caffeine 40 mG 1 Capsule(s) Oral every 6 hours PRN headache  ALBUTerol    90 MICROgram(s) HFA Inhaler 2 Puff(s) Inhalation two times a day PRN Shortness of Breath and/or Wheezing  bisacodyl 5 milliGRAM(s) Oral every 12 hours PRN Constipation  bisacodyl Suppository 10 milliGRAM(s) Rectal daily PRN Constipation  ondansetron Injectable 4 milliGRAM(s) IV Push every 6 hours PRN Nausea and/or Vomiting  oxyCODONE    IR 5 milliGRAM(s) Oral every 4 hours PRN Moderate Pain (4 - 6)  oxyCODONE    IR 10 milliGRAM(s) Oral every 4 hours PRN Severe Pain (7 - 10)  senna 2 Tablet(s) Oral at bedtime PRN Constipation  simethicone 80 milliGRAM(s) Chew two times a day PRN Gas      Vital Signs Last 24 Hrs  T(C): 36.8 (25 Jul 2018 09:00), Max: 38.1 (24 Jul 2018 17:53)  T(F): 98.2 (25 Jul 2018 09:00), Max: 100.5 (24 Jul 2018 17:53)  HR: 87 (25 Jul 2018 09:00) (77 - 105)  BP: 118/62 (25 Jul 2018 09:00) (102/45 - 125/58)  BP(mean): --  RR: 16 (25 Jul 2018 09:00) (16 - 19)  SpO2: 96% (25 Jul 2018 06:02) (95% - 99%)    I&O's Summary    24 Jul 2018 07:01  -  25 Jul 2018 07:00  --------------------------------------------------------  IN: 540 mL / OUT: 1100 mL / NET: -560 mL        Physical Exam:  General: NAD, resting comfortably  HEENT: NC/AT, EOMI, normal hearing, no carotid bruits  Pulmonary: normal resp effort, CTA-B  Cardiovascular: NSR  Abdominal: soft, NT/ND, no organomegaly  Extremities: WWP, normal strength, no clubbing/cyanosis/edema/or erythema.  Neuro: A/O x 3, CNs II-XII grossly intact, normal sensation, no focal deficits  Pulses:   Right:  FEM [ x]2+ [ ]1+ [ ]doppler    POP [ ]2+ [ ]1+ [ x]Doppler biphasic  DP [ ]2+ [ ]1+ [ x]Doppler triphasic  PT[ ]2+ [ ]1+ [ x]Doppler biphasic    Left:  FEM [x ]2+ [ ]1+ [ ]doppler  POP [ ]2+ [ ]1+ [x ]Doppler triphasic  DP [ ]2+ [ ]1+ [x ]Doppler triphasic  PT [ ]2+ [ ]1+ [x ]Doppler triphasic      LABS:                        11.4   9.7   )-----------( 364      ( 25 Jul 2018 07:11 )             35.6     07-25    141  |  102  |  15  ----------------------------<  121<H>  4.5   |  27  |  0.60    Ca    8.7      25 Jul 2018 07:11  Phos  4.0     07-24  Mg     2.0     07-24      Cultures:  Blood Cultures 7/23:  no growth at 2 days      RADIOLOGY & ADDITIONAL STUDIES:   Bilateral Lower Extermity duplex: no evidence of DVT, 3.3 cm right bakers cyst

## 2018-07-25 NOTE — DISCHARGE NOTE ADULT - CARE PROVIDER_API CALL
Pavan Figueroa), Neurological Surgery  [address]  [address]  [place] [address]  Phone: [phone]  Fax: [phone]

## 2018-07-25 NOTE — DISCHARGE NOTE ADULT - PATIENT PORTAL LINK FT
You can access the [facility], offered by [facility], by registering with the following website: [email or web address]

## 2018-07-25 NOTE — DISCHARGE NOTE ADULT - HOSPITAL COURSE
History of Present Illness		  Pt is 69yo female with Lower back pain and left leg pain, here for an elective surgery with Dr. Figueroa    Allergies/Medications:   Allergies:        Allergies:  	Flagyl: Drug, Rash  	tramadol: Drug, Rash  	gabapentin: Drug, Rash  	lidocaine: Drug, Headache, body tremors  	Ceclor: Drug, Swelling    PMH/PSH/FH/SH:   Past Medical History:  Aortic valve disorder    Asthma    GERD (gastroesophageal reflux disease)    Hypercholesterolemia    Hypothyroidism    Lumbar spondylosis    MERYL (obstructive sleep apnea)  syndrome  MERYL on CPAP    Osteoarthritis of knee    Peripheral venous insufficiency    Recurrent urinary tract infection    Vitamin D deficiency.    Past Surgical History:  H/O arthroscopy of left knee    H/O arthroscopy of right knee  x2  H/O carpal tunnel repair  left  H/O vascular surgery  left lower extremity  History of cholecystectomy    History of pubovaginal sling    History of total hysterectomy    S/P rotator cuff repair  right.    Pt admitted and underwent Lumbar laminectomy  07/20/2018  Left L2-L3, L3-L4    Pt doing well discharge home History of Present Illness		  Pt is 67yo female with Lower back pain and left leg pain, here for an elective surgery with Dr. Figueroa    Allergies/Medications:   Allergies:        Allergies:  	Flagyl: Drug, Rash  	tramadol: Drug, Rash  	gabapentin: Drug, Rash  	lidocaine: Drug, Headache, body tremors  	Ceclor: Drug, Swelling    PMH/PSH/FH/SH:   Past Medical History:  Aortic valve disorder    Asthma    GERD (gastroesophageal reflux disease)    Hypercholesterolemia    Hypothyroidism    Lumbar spondylosis    MERYL (obstructive sleep apnea)  syndrome  MERYL on CPAP    Osteoarthritis of knee    Peripheral venous insufficiency    Recurrent urinary tract infection    Vitamin D deficiency.    Past Surgical History:  H/O arthroscopy of left knee    H/O arthroscopy of right knee  x2  H/O carpal tunnel repair  left  H/O vascular surgery  left lower extremity  History of cholecystectomy    History of pubovaginal sling    History of total hysterectomy    S/P rotator cuff repair  right.    Pt admitted and underwent Lumbar laminectomy  07/20/2018  Left L2-L3, L3-L4  flat for 48hr no evidence post op CSF leak   Pt doing well discharge home

## 2018-07-26 VITALS
RESPIRATION RATE: 17 BRPM | HEART RATE: 89 BPM | DIASTOLIC BLOOD PRESSURE: 67 MMHG | SYSTOLIC BLOOD PRESSURE: 124 MMHG | OXYGEN SATURATION: 95 % | TEMPERATURE: 99 F

## 2018-07-26 LAB
ANION GAP SERPL CALC-SCNC: 11 MMOL/L — SIGNIFICANT CHANGE UP (ref 5–17)
BUN SERPL-MCNC: 11 MG/DL — SIGNIFICANT CHANGE UP (ref 7–23)
CALCIUM SERPL-MCNC: 9 MG/DL — SIGNIFICANT CHANGE UP (ref 8.4–10.5)
CHLORIDE SERPL-SCNC: 99 MMOL/L — SIGNIFICANT CHANGE UP (ref 96–108)
CO2 SERPL-SCNC: 29 MMOL/L — SIGNIFICANT CHANGE UP (ref 22–31)
CREAT SERPL-MCNC: 0.61 MG/DL — SIGNIFICANT CHANGE UP (ref 0.5–1.3)
GLUCOSE SERPL-MCNC: 116 MG/DL — HIGH (ref 70–99)
HCT VFR BLD CALC: 35.7 % — SIGNIFICANT CHANGE UP (ref 34.5–45)
HGB BLD-MCNC: 11.4 G/DL — LOW (ref 11.5–15.5)
MCHC RBC-ENTMCNC: 26 PG — LOW (ref 27–34)
MCHC RBC-ENTMCNC: 31.9 G/DL — LOW (ref 32–36)
MCV RBC AUTO: 81.3 FL — SIGNIFICANT CHANGE UP (ref 80–100)
PLATELET # BLD AUTO: 400 K/UL — SIGNIFICANT CHANGE UP (ref 150–400)
POTASSIUM SERPL-MCNC: 4.4 MMOL/L — SIGNIFICANT CHANGE UP (ref 3.5–5.3)
POTASSIUM SERPL-SCNC: 4.4 MMOL/L — SIGNIFICANT CHANGE UP (ref 3.5–5.3)
RBC # BLD: 4.39 M/UL — SIGNIFICANT CHANGE UP (ref 3.8–5.2)
RBC # FLD: 14.9 % — SIGNIFICANT CHANGE UP (ref 10.3–16.9)
SODIUM SERPL-SCNC: 139 MMOL/L — SIGNIFICANT CHANGE UP (ref 135–145)
WBC # BLD: 8.4 K/UL — SIGNIFICANT CHANGE UP (ref 3.8–10.5)
WBC # FLD AUTO: 8.4 K/UL — SIGNIFICANT CHANGE UP (ref 3.8–10.5)

## 2018-07-26 PROCEDURE — 80048 BASIC METABOLIC PNL TOTAL CA: CPT

## 2018-07-26 PROCEDURE — 84100 ASSAY OF PHOSPHORUS: CPT

## 2018-07-26 PROCEDURE — 94660 CPAP INITIATION&MGMT: CPT

## 2018-07-26 PROCEDURE — 97162 PT EVAL MOD COMPLEX 30 MIN: CPT

## 2018-07-26 PROCEDURE — 97535 SELF CARE MNGMENT TRAINING: CPT

## 2018-07-26 PROCEDURE — 93970 EXTREMITY STUDY: CPT

## 2018-07-26 PROCEDURE — 85027 COMPLETE CBC AUTOMATED: CPT

## 2018-07-26 PROCEDURE — 36415 COLL VENOUS BLD VENIPUNCTURE: CPT

## 2018-07-26 PROCEDURE — 81001 URINALYSIS AUTO W/SCOPE: CPT

## 2018-07-26 PROCEDURE — 71045 X-RAY EXAM CHEST 1 VIEW: CPT

## 2018-07-26 PROCEDURE — 99232 SBSQ HOSP IP/OBS MODERATE 35: CPT | Mod: GC

## 2018-07-26 PROCEDURE — 76000 FLUOROSCOPY <1 HR PHYS/QHP: CPT

## 2018-07-26 PROCEDURE — 87040 BLOOD CULTURE FOR BACTERIA: CPT

## 2018-07-26 PROCEDURE — 97116 GAIT TRAINING THERAPY: CPT

## 2018-07-26 PROCEDURE — 83735 ASSAY OF MAGNESIUM: CPT

## 2018-07-26 PROCEDURE — 94640 AIRWAY INHALATION TREATMENT: CPT

## 2018-07-26 PROCEDURE — C1889: CPT

## 2018-07-26 PROCEDURE — 97161 PT EVAL LOW COMPLEX 20 MIN: CPT

## 2018-07-26 RX ORDER — SENNA PLUS 8.6 MG/1
2 TABLET ORAL
Qty: 0 | Refills: 0 | COMMUNITY
Start: 2018-07-26

## 2018-07-26 RX ORDER — ACETAMINOPHEN 500 MG
2 TABLET ORAL
Qty: 0 | Refills: 0 | COMMUNITY
Start: 2018-07-26

## 2018-07-26 RX ORDER — CIPROFLOXACIN LACTATE 400MG/40ML
1 VIAL (ML) INTRAVENOUS
Qty: 8 | Refills: 0 | OUTPATIENT
Start: 2018-07-26 | End: 2018-07-29

## 2018-07-26 RX ORDER — DOCUSATE SODIUM 100 MG
1 CAPSULE ORAL
Qty: 0 | Refills: 0 | COMMUNITY
Start: 2018-07-26

## 2018-07-26 RX ADMIN — OXYCODONE HYDROCHLORIDE 10 MILLIGRAM(S): 5 TABLET ORAL at 09:38

## 2018-07-26 RX ADMIN — Medication 750 MILLIGRAM(S): at 00:10

## 2018-07-26 RX ADMIN — Medication 750 MILLIGRAM(S): at 12:00

## 2018-07-26 RX ADMIN — Medication 25 MICROGRAM(S): at 05:30

## 2018-07-26 RX ADMIN — BUDESONIDE AND FORMOTEROL FUMARATE DIHYDRATE 2 PUFF(S): 160; 4.5 AEROSOL RESPIRATORY (INHALATION) at 05:30

## 2018-07-26 RX ADMIN — Medication 100 MILLIGRAM(S): at 05:30

## 2018-07-26 RX ADMIN — OXYCODONE HYDROCHLORIDE 10 MILLIGRAM(S): 5 TABLET ORAL at 17:11

## 2018-07-26 RX ADMIN — OXYCODONE HYDROCHLORIDE 10 MILLIGRAM(S): 5 TABLET ORAL at 10:05

## 2018-07-26 NOTE — PROGRESS NOTE ADULT - PROBLEM SELECTOR PLAN 3
The patient has taken her on Advair. No bronchospasm.

## 2018-07-26 NOTE — PROGRESS NOTE ADULT - SUBJECTIVE AND OBJECTIVE BOX
Attending:  Cl[place]    Interval events: Patient reports that her lower extremitiy pain as improved slights.     Meds:  Allerg: ceclor, flagyl, gabapentin, lidocane, tramadol    PAST MEDICAL & SURGICAL HISTORY:  MERYL on CPAP  Hypothyroidism  Osteoarthritis of knee  GERD (gastroesophageal reflux disease)  Asthma  Peripheral venous insufficiency  Hypercholesterolemia  MERYL (obstructive sleep apnea): syndrome  Aortic valve disorder  Vitamin D deficiency  Recurrent urinary tract infection  Lumbar spondylosis s/p lumbar laminectomy  History of pubovaginal sling  H/O arthroscopy of left knee  S/P rotator cuff repair: right  H/O carpal tunnel repair: left  History of total hysterectomy  H/O vascular surgery: left lower extremity left GSV RFA in 2007  History of cholecystectomy  H/O arthroscopy of right knee: x2      MEDICATIONS  (STANDING):  buDESOnide 160 MICROgram(s)/formoterol 4.5 MICROgram(s) Inhaler 2 Puff(s) Inhalation two times a day  ciprofloxacin     Tablet 750 milliGRAM(s) Oral every 12 hours  docusate sodium 100 milliGRAM(s) Oral three times a day  enoxaparin Injectable 40 milliGRAM(s) SubCutaneous at bedtime  levothyroxine 25 MICROGram(s) Oral daily  pantoprazole    Tablet 40 milliGRAM(s) Oral daily  simvastatin 20 milliGRAM(s) Oral at bedtime  sodium chloride 0.9%. 1000 milliLiter(s) (60 mL/Hr) IV Continuous <Continuous>    MEDICATIONS  (PRN):  acetaminophen   Tablet 650 milliGRAM(s) Oral every 6 hours PRN For Temp greater than 38 C (100.4 F)  acetaminophen 300 mG/butalbital 50 mG/ caffeine 40 mG 1 Capsule(s) Oral every 6 hours PRN headache  ALBUTerol    90 MICROgram(s) HFA Inhaler 2 Puff(s) Inhalation two times a day PRN Shortness of Breath and/or Wheezing  bisacodyl 5 milliGRAM(s) Oral every 12 hours PRN Constipation  bisacodyl Suppository 10 milliGRAM(s) Rectal daily PRN Constipation  ondansetron Injectable 4 milliGRAM(s) IV Push every 6 hours PRN Nausea and/or Vomiting  oxyCODONE    IR 5 milliGRAM(s) Oral every 4 hours PRN Moderate Pain (4 - 6)  oxyCODONE    IR 10 milliGRAM(s) Oral every 4 hours PRN Severe Pain (7 - 10)  senna 2 Tablet(s) Oral at bedtime PRN Constipation  simethicone 80 milliGRAM(s) Chew two times a day PRN Gas      Vital Signs Last 24 Hrs  T(C): 36.8 (25 Jul 2018 09:00), Max: 38.1 (24 Jul 2018 17:53)  T(F): 98.2 (25 Jul 2018 09:00), Max: 100.5 (24 Jul 2018 17:53)  HR: 87 (25 Jul 2018 09:00) (77 - 105)  BP: 118/62 (25 Jul 2018 09:00) (102/45 - 125/58)  BP(mean): --  RR: 16 (25 Jul 2018 09:00) (16 - 19)  SpO2: 96% (25 Jul 2018 06:02) (95% - 99%)    I&O's Summary    24 Jul 2018 07:01  -  25 Jul 2018 07:00  --------------------------------------------------------  IN: 540 mL / OUT: 1100 mL / NET: -560 mL        Physical Exam:  General: NAD, resting comfortably  HEENT: NC/AT, EOMI, normal hearing, no carotid bruits  Pulmonary: normal resp effort, CTA-B  Cardiovascular: NSR  Abdominal: soft, NT/ND, no organomegaly  Extremities: WWP, normal strength, no clubbing/cyanosis/or erythema.  Neuro: A/O x 3, CNs II-XII grossly intact, normal sensation, no focal deficits  Pulses:   Right:  FEM [ x]2+ [ ]1+ [ ]doppler    POP [ ]2+ [ ]1+ [ x]Doppler biphasic  DP [ ]2+ [ ]1+ [ x]Doppler triphasic  PT[ ]2+ [ ]1+ [ x]Doppler biphasic    Left:  FEM [x ]2+ [ ]1+ [ ]doppler  POP [ ]2+ [ ]1+ [x ]Doppler triphasic  DP [ ]2+ [ ]1+ [x ]Doppler triphasic  PT [ ]2+ [ ]1+ [x ]Doppler triphasic      LABS:                        11.4   9.7   )-----------( 364      ( 25 Jul 2018 07:11 )             35.6     07-25    141  |  102  |  15  ----------------------------<  121<H>  4.5   |  27  |  0.60    Ca    8.7      25 Jul 2018 07:11  Phos  4.0     07-24  Mg     2.0     07-24      Cultures:  Blood Cultures 7/23:  no growth at 2 days      RADIOLOGY & ADDITIONAL STUDIES:   Bilateral Lower Extermity duplex: no evidence of DVT, 3.3 cm right bakers cyst          Assessment and Plan:   · Assessment		  Assessment: 68y Female s/p lumbar laminectomy POD#5 with new complaint of left lower extremity pain, bilateral lower extremities swollen with no signs of cellulitis     Recommendations:  - continue with bilateral lower extremity ace wraps  - call [phone] with any questions

## 2018-07-26 NOTE — PROGRESS NOTE ADULT - PROBLEM SELECTOR PLAN 5
Blood cultures stable and no evidence of valve dysfunction

## 2018-07-26 NOTE — PROGRESS NOTE ADULT - SUBJECTIVE AND OBJECTIVE BOX
HPI:  Pt is 67yo female with Lower back pain and left leg pain, here for an elective surgery with Dr. Figueroa (20 Jul 2018 09:57)    OVERNIGHT EVENTS:  Vital Signs Last 24 Hrs  T(C): 36.9 (26 Jul 2018 08:26), Max: 37.3 (25 Jul 2018 20:58)  T(F): 98.4 (26 Jul 2018 08:26), Max: 99.1 (25 Jul 2018 20:58)  HR: 82 (26 Jul 2018 08:26) (82 - 102)  BP: 130/76 (26 Jul 2018 08:26) (116/54 - 155/73)  BP(mean): --  RR: 17 (26 Jul 2018 08:26) (16 - 17)  SpO2: 95% (26 Jul 2018 08:26) (92% - 100%)    I&O's Summary    25 Jul 2018 07:01  -  26 Jul 2018 07:00  --------------------------------------------------------  IN: 0 mL / OUT: 300 mL / NET: -300 mL        PHYSICAL EXAM:  Neurological:      A&Ox3 Cranial nerves intact  BAZAN  Incision CDI    Cardiovascular: RRR  Respiratory: Lungs CTAB  Gastrointestinal: +BS  Genitourinary: voiding without difficulty  Extremities: warm and dry  Incision/Wound: CDi    DIET: Regular    LABS:                        11.4   8.4   )-----------( 400      ( 26 Jul 2018 08:28 )             35.7     07-26    139  |  99  |  11  ----------------------------<  116<H>  4.4   |  29  |  0.61    Ca    9.0      26 Jul 2018 08:28      CAPILLARY BLOOD GLUCOSE      Drug Levels: [] N/A    CSF Analysis: [] N/A      Allergies    Ceclor (Swelling)  Flagyl (Rash)  gabapentin (Rash)  lidocaine (Headache)  tramadol (Rash)    Intolerances      MEDICATIONS:  Antibiotics:  ciprofloxacin     Tablet 750 milliGRAM(s) Oral every 12 hours    Neuro:  acetaminophen   Tablet 650 milliGRAM(s) Oral every 6 hours PRN  acetaminophen 300 mG/butalbital 50 mG/ caffeine 40 mG 1 Capsule(s) Oral every 6 hours PRN  ondansetron Injectable 4 milliGRAM(s) IV Push every 6 hours PRN  oxyCODONE    IR 5 milliGRAM(s) Oral every 4 hours PRN  oxyCODONE    IR 10 milliGRAM(s) Oral every 4 hours PRN    Anticoagulation:  enoxaparin Injectable 40 milliGRAM(s) SubCutaneous at bedtime    OTHER:  ALBUTerol    90 MICROgram(s) HFA Inhaler 2 Puff(s) Inhalation two times a day PRN  bisacodyl 5 milliGRAM(s) Oral every 12 hours PRN  bisacodyl Suppository 10 milliGRAM(s) Rectal daily PRN  buDESOnide 160 MICROgram(s)/formoterol 4.5 MICROgram(s) Inhaler 2 Puff(s) Inhalation two times a day  docusate sodium 100 milliGRAM(s) Oral three times a day  levothyroxine 25 MICROGram(s) Oral daily  pantoprazole    Tablet 40 milliGRAM(s) Oral daily  senna 2 Tablet(s) Oral at bedtime PRN  simethicone 80 milliGRAM(s) Chew two times a day PRN  simvastatin 20 milliGRAM(s) Oral at bedtime    IVF:  sodium chloride 0.9%. 1000 milliLiter(s) IV Continuous <Continuous>    CULTURES:  Culture Results:   No growth at 3 days. (07-22 @ 20:52)    ASSESSMENT:  68y Female s/p Lumbar laminectomy  07/20/2018  Left L2-L3, L3-L4  .      PLAN:    NEURO:    Monitor neuro status  OT/PT  Pain Managment  Bowel regime  Continue curent medical regime    Dispo: Discussed with out difficulty

## 2018-07-26 NOTE — PROGRESS NOTE ADULT - PROBLEM SELECTOR PLAN 2
Patient is compliant with the CPAP mask. Baseline oxygen saturation is normal

## 2018-07-26 NOTE — DIETITIAN INITIAL EVALUATION ADULT. - ENERGY NEEDS
Height: 5'0" Weight: 230lbs, IBW 100lbs+/-10%, %%, BMI 44.8  IBW used for calculations as pt >120% of IBW   Nutrient needs based on [facility] standards of care for maintenance in older adults.   Needs adjusted for age and post-op

## 2018-07-26 NOTE — PROGRESS NOTE ADULT - SUBJECTIVE AND OBJECTIVE BOX
Interval Events: Reviewed  Patient seen and examined at bedside.    Patient is a 68y old  Female who presents with a chief complaint of Lower back pain and LLE pain (25 Jul 2018 13:49)  She is doing better. She still has some back pain that is different from her previous pain now it's around the incision.    PAST MEDICAL & SURGICAL HISTORY:  MERYL on CPAP  Hypothyroidism  Osteoarthritis of knee  GERD (gastroesophageal reflux disease)  Asthma  Peripheral venous insufficiency  Hypercholesterolemia  MERYL (obstructive sleep apnea): syndrome  Aortic valve disorder  Vitamin D deficiency  Recurrent urinary tract infection  Lumbar spondylosis  History of pubovaginal sling  H/O arthroscopy of left knee  S/P rotator cuff repair: right  H/O carpal tunnel repair: left  History of total hysterectomy  H/O vascular surgery: left lower extremity  History of cholecystectomy  H/O arthroscopy of right knee: x2      MEDICATIONS:  Pulmonary:  ALBUTerol    90 MICROgram(s) HFA Inhaler 2 Puff(s) Inhalation two times a day PRN  buDESOnide 160 MICROgram(s)/formoterol 4.5 MICROgram(s) Inhaler 2 Puff(s) Inhalation two times a day    Antimicrobials:  ciprofloxacin     Tablet 750 milliGRAM(s) Oral every 12 hours    Anticoagulants:  enoxaparin Injectable 40 milliGRAM(s) SubCutaneous at bedtime    Cardiac:      Allergies    Ceclor (Swelling)  Flagyl (Rash)  gabapentin (Rash)  lidocaine (Headache)  tramadol (Rash)    Intolerances        Vital Signs Last 24 Hrs  T(C): 37.2 (26 Jul 2018 17:06), Max: 37.4 (26 Jul 2018 14:50)  T(F): 98.9 (26 Jul 2018 17:06), Max: 99.4 (26 Jul 2018 14:50)  HR: 89 (26 Jul 2018 17:06) (82 - 95)  BP: 124/67 (26 Jul 2018 17:06) (116/52 - 155/73)  BP(mean): --  RR: 17 (26 Jul 2018 17:06) (16 - 17)  SpO2: 95% (26 Jul 2018 17:06) (95% - 100%)    07-25 @ 07:01  -  07-26 @ 07:00  --------------------------------------------------------  IN: 0 mL / OUT: 300 mL / NET: -300 mL          LABS:      CBC Full  -  ( 26 Jul 2018 08:28 )  WBC Count : 8.4 K/uL  Hemoglobin : 11.4 g/dL  Hematocrit : 35.7 %  Platelet Count - Automated : 400 K/uL  Mean Cell Volume : 81.3 fL  Mean Cell Hemoglobin : 26.0 pg  Mean Cell Hemoglobin Concentration : 31.9 g/dL  Auto Neutrophil # : x  Auto Lymphocyte # : x  Auto Monocyte # : x  Auto Eosinophil # : x  Auto Basophil # : x  Auto Neutrophil % : x  Auto Lymphocyte % : x  Auto Monocyte % : x  Auto Eosinophil % : x  Auto Basophil % : x    07-26    139  |  99  |  11  ----------------------------<  116<H>  4.4   |  29  |  0.61    Ca    9.0      26 Jul 2018 08:28                          RADIOLOGY & ADDITIONAL STUDIES (The following images were personally reviewed):  Coats:                                     No  Urine output:                       adequate  DVT prophylaxis:                 Yes  Flattus:                                  Yes  Bowel movement:              No

## 2018-07-26 NOTE — DIETITIAN INITIAL EVALUATION ADULT. - OTHER INFO
67yo F s/p L2-3 L3-4 laminectomy. Pt seen resting in bed. Currently on a regular diet and tolerating PO. Endorsing good appetite, consuming >75% meals. Denies N/V. Having regular bowel movements 2/2 bowel regimen. Discussed increased needs for post-op recovery. NKFA or dietary restrictions. No known wt changes. Skin: surgical incision; GI WDL per flowsheet.

## 2018-07-26 NOTE — PROGRESS NOTE ADULT - PROBLEM SELECTOR PLAN 1
The temperature curve improved. The patient was started on ciprofloxacin. Her white count also improved. The source of her infection could be bronchitis. Blood culture is negative to date.   The patient is ambulating. The temperature curve is down the white count is normal cultures are negative continue antibiotic for a total five days
The temperature curve improved. The patient was started on ciprofloxacin. Her white count also improved. The source of her infection could be bronchitis. Blood culture is negative to date. .  She is afebrile. Finish five days total of antibiotic  The patient is ambulating. The temperature curve is down the white count is normal cultures are negative continue antibiotic for a total five days
The temperature curve improved. The patient was started on ciprofloxacin. Her white count also improved. The source of her infection could be bronchitis. Blood culture is negative to date. The patient knee immobilizer he should and will follow with neurosurgery recommendation

## 2018-07-27 LAB
CULTURE RESULTS: SIGNIFICANT CHANGE UP
SPECIMEN SOURCE: SIGNIFICANT CHANGE UP

## 2018-07-27 RX ORDER — CYCLOBENZAPRINE HYDROCHLORIDE 10 MG/1
1 TABLET, FILM COATED ORAL
Qty: 45 | Refills: 0 | OUTPATIENT
Start: 2018-07-27 | End: 2018-08-10

## 2018-08-01 DIAGNOSIS — Z88.5 ALLERGY STATUS TO NARCOTIC AGENT: ICD-10-CM

## 2018-08-01 DIAGNOSIS — K21.9 GASTRO-ESOPHAGEAL REFLUX DISEASE WITHOUT ESOPHAGITIS: ICD-10-CM

## 2018-08-01 DIAGNOSIS — E78.00 PURE HYPERCHOLESTEROLEMIA, UNSPECIFIED: ICD-10-CM

## 2018-08-01 DIAGNOSIS — J45.909 UNSPECIFIED ASTHMA, UNCOMPLICATED: ICD-10-CM

## 2018-08-01 DIAGNOSIS — E66.9 OBESITY, UNSPECIFIED: ICD-10-CM

## 2018-08-01 DIAGNOSIS — Z99.89 DEPENDENCE ON OTHER ENABLING MACHINES AND DEVICES: ICD-10-CM

## 2018-08-01 DIAGNOSIS — M17.9 OSTEOARTHRITIS OF KNEE, UNSPECIFIED: ICD-10-CM

## 2018-08-01 DIAGNOSIS — G47.33 OBSTRUCTIVE SLEEP APNEA (ADULT) (PEDIATRIC): ICD-10-CM

## 2018-08-01 DIAGNOSIS — E78.5 HYPERLIPIDEMIA, UNSPECIFIED: ICD-10-CM

## 2018-08-01 DIAGNOSIS — M47.816 SPONDYLOSIS WITHOUT MYELOPATHY OR RADICULOPATHY, LUMBAR REGION: ICD-10-CM

## 2018-08-01 DIAGNOSIS — I35.9 NONRHEUMATIC AORTIC VALVE DISORDER, UNSPECIFIED: ICD-10-CM

## 2018-08-01 DIAGNOSIS — J40 BRONCHITIS, NOT SPECIFIED AS ACUTE OR CHRONIC: ICD-10-CM

## 2018-08-01 DIAGNOSIS — E55.9 VITAMIN D DEFICIENCY, UNSPECIFIED: ICD-10-CM

## 2018-08-01 DIAGNOSIS — E03.9 HYPOTHYROIDISM, UNSPECIFIED: ICD-10-CM

## 2018-08-01 DIAGNOSIS — R32 UNSPECIFIED URINARY INCONTINENCE: ICD-10-CM

## 2018-08-01 DIAGNOSIS — Z90.710 ACQUIRED ABSENCE OF BOTH CERVIX AND UTERUS: ICD-10-CM

## 2018-08-01 DIAGNOSIS — Z88.6 ALLERGY STATUS TO ANALGESIC AGENT: ICD-10-CM

## 2018-08-01 DIAGNOSIS — J98.11 ATELECTASIS: ICD-10-CM

## 2018-08-01 DIAGNOSIS — Z90.49 ACQUIRED ABSENCE OF OTHER SPECIFIED PARTS OF DIGESTIVE TRACT: ICD-10-CM

## 2018-08-01 DIAGNOSIS — I87.2 VENOUS INSUFFICIENCY (CHRONIC) (PERIPHERAL): ICD-10-CM

## 2018-08-01 DIAGNOSIS — G96.0 CEREBROSPINAL FLUID LEAK: ICD-10-CM

## 2018-08-01 DIAGNOSIS — Z87.440 PERSONAL HISTORY OF URINARY (TRACT) INFECTIONS: ICD-10-CM

## 2020-05-08 NOTE — DISCHARGE NOTE ADULT - PLAN OF CARE
return home and regain your independent activity Diet: Regular  Activity as tolerated  No heavy lifting anything greater than 10 pounds  No bending or lifting  Limit your activity until you follow up with Dr Figueroa on Friday 8/10  Call for an appointment  Any increase in headache temperature greater than 101.5 degrees F call the office pulse oximetry

## 2021-08-31 NOTE — OCCUPATIONAL THERAPY INITIAL EVALUATION ADULT - HEALTH SCREEN CRITERIA
Refill refused. Filled on 5/21/19  #90 R-3. Receipt confirmed by pharmacy @ [phone].     Viola Jimenez RN Triage Nurse Advisor [facility]       yes Eliptical Excision Additional Text (Leave Blank If You Do Not Want): The margin was drawn around the clinically apparent lesion.  An elliptical shape was then drawn on the skin incorporating the lesion and margins.  Incisions were then made along these lines to the appropriate tissue plane and the lesion was extirpated.

## 2022-08-25 NOTE — DIETITIAN INITIAL EVALUATION ADULT. - NS AS NUTRI INTERV ED CONTENT
Purpose of the nutrition education/Pt was educated on increased needs post-op. Discussed optimal nutrient dense foods high in protein. Pt was receptive and expressed understanding. Epidermal Autograft Text: The defect edges were debeveled with a #15 scalpel blade.  Given the location of the defect, shape of the defect and the proximity to free margins an epidermal autograft was deemed most appropriate.  Using a sterile surgical marker, the primary defect shape was transferred to the donor site. The epidermal graft was then harvested.  The skin graft was then placed in the primary defect and oriented appropriately.

## 2023-07-22 NOTE — DISCHARGE NOTE ADULT - HAS THE PATIENT RECEIVED THE INFLUENZA VACCINE DURING THIS VISIT
Diabetes and kidney and liver function are stable      just try to drink a little more water white blood cell count is normal           a little borderline anemic at 11.7       but better than last time at 10.3.   Thyroid is normal   prostate level is normal           cholesterol normal at 158      triglycerides are normal at 112       blood work all looks stable keep up the good work.
No

## 2023-09-21 NOTE — PATIENT PROFILE ADULT. - TYPE OF ADMISSION, PATIENT PROFILE
"Chief Complaint  Cough (Coughing up green mucus ), Nasal Congestion, and Earache    Subjective          Hope Serena Cuevas presents to [facility]  History of Present Illness    Here with 2 weeks of congestion, headache, runny nose, sinus pressure, and cough productive of \"dark green\" sputum.  Also reports left otalgia.  No fever, no vomitign, no diarrhea.  Having mild sore throat, and reports \"chest congestion.\"    Using albuterol TID during this sick period.    Of note, she didn't take her BP medicine this morning.    Current Outpatient Medications   Medication Instructions    albuterol sulfate  (90 Base) MCG/ACT inhaler 2 puffs, Inhalation, Every 4 Hours PRN    amLODIPine-benazepril (Lotrel) 10-20 MG per capsule 1 capsule, Oral, Daily    azithromycin (Zithromax) 250 MG tablet Take two tabs (500 mg) by mouth on day 1.  Take one tab (250 mg) by mouth on days 2-5.    chlorthalidone (HYGROTON) 25 mg, Oral, Daily    diazePAM (Valium) 5 MG tablet Take 1 tab 30-45 min before MRI    dicyclomine (BENTYL) 20 MG tablet Take 1 p.o. 3 times daily as needed abdominal pain or cramping.    famotidine (PEPCID) 20 mg, Oral, 2 Times Daily    gabapentin (NEURONTIN) 300 MG capsule TAKE 1 CAPSULE BY MOUTH 4 TIMES A DAY.    ibuprofen (ADVIL,MOTRIN) 800 MG tablet Take 1 tablet by mouth Every 8 (Eight) Hours As Needed.    ondansetron ODT (ZOFRAN-ODT) 4 mg, Translingual, Every 8 Hours PRN    predniSONE (DELTASONE) 40 mg, Oral, Daily    traZODone (DESYREL) 50 mg, Oral, Nightly       The following portions of the patient's history were reviewed and updated as appropriate: allergies, current medications, past family history, past medical history, past social history, past surgical history, and problem list.    Objective   Vital Signs:   /87 (BP Location: Left arm, Patient Position: Sitting, Cuff Size: Adult)   Pulse 91   Temp 99.3 °F (37.4 °C) (Temporal)   Resp 20   Ht 165.1 " "cm (65\")   Wt 87.8 kg (193 lb 9.6 oz)   SpO2 95%   BMI 32.22 kg/m²     BP Readings from Last 3 Encounters:   09/21/23 150/87   08/09/23 136/91   08/08/23 114/68     Wt Readings from Last 3 Encounters:   09/21/23 87.8 kg (193 lb 9.6 oz)   08/10/23 81.6 kg (180 lb)   08/09/23 81.8 kg (180 lb 6.4 oz)           Physical Exam  Vitals reviewed.   Constitutional:       General: She is not in acute distress.     Appearance: Normal appearance. She is not ill-appearing, toxic-appearing or diaphoretic.   HENT:      Head: Normocephalic and atraumatic.      Comments: No frontal sinus TTP.  Maxillary sinus TTP noted bilaterally     Right Ear: Tympanic membrane and external ear normal.      Left Ear: Tympanic membrane and external ear normal.   Eyes:      Conjunctiva/sclera: Conjunctivae normal.   Cardiovascular:      Rate and Rhythm: Normal rate and regular rhythm.      Pulses: Normal pulses.      Heart sounds: Normal heart sounds. No murmur heard.    No friction rub. No gallop.   Pulmonary:      Effort: Pulmonary effort is normal. No respiratory distress.      Breath sounds: Normal breath sounds. No stridor. No wheezing, rhonchi or rales.   Chest:      Chest wall: No tenderness.   Abdominal:      General: Abdomen is flat.      Palpations: Abdomen is soft. There is no mass.      Tenderness: There is no abdominal tenderness.   Musculoskeletal:      Right lower leg: No edema.      Left lower leg: No edema.   Skin:     General: Skin is warm and dry.   Neurological:      General: No focal deficit present.      Mental Status: She is alert. Mental status is at baseline.   Psychiatric:         Behavior: Behavior normal.         Thought Content: Thought content normal.         Judgment: Judgment normal.      Result Review :   The following data was reviewed by: Thierno Li MD on 09/21/2023:  Common labs          7/25/2023    14:07 7/31/2023    15:09 8/9/2023    12:31   Common Labs   Glucose 101  86  78    BUN 9  10  22  "   Creatinine 0.76  0.74  0.95    Sodium 137  138  139    Potassium 3.1  3.4  3.7    Chloride 99  104  102    Calcium 9.8  9.4  10.4    Albumin 4.2  4.1  4.8    Total Bilirubin 0.5  0.4  0.7    Alkaline Phosphatase 128  114  121    AST (SGOT) 24  20  28    ALT (SGPT) 66  33  35    WBC  10.46     Hemoglobin  11.9     Hematocrit  36.0     Platelets  354              Lab Results   Component Value Date    SARSANTIGEN Not Detected 09/21/2023    COVID19 Not Detected 03/10/2023    RAPFLUA Negative 09/21/2023    RAPFLUB Negative 09/21/2023    FLUAAG Not Detected 09/21/2023    FLUBAG Not Detected 09/21/2023    RAPSCRN Negative 02/17/2023    INR 1.03 07/18/2023    BILIRUBINUR Negative 07/31/2023       Procedures        Assessment and Plan    Diagnoses and all orders for this visit:    1. Acute cough (Primary)  -     POC Influenza A / B  -     POCT SARS-CoV-2 Antigen CHASE  -     COVID-19,CEPHEID/IVETTE,COR/IVETTE/PAD/VALERIE/MAD IN-HOUSE(OR EMERGENT/ADD-ON),NP SWAB IN TRANSPORT MEDIA 3-4 HR TAT, RT-PCR - Swab, Nasopharynx    2. Acute bronchitis, unspecified organism  -     predniSONE (DELTASONE) 20 MG tablet; Take 2 tablets by mouth Daily for 5 days.  Dispense: 10 tablet; Refill: 0  -     azithromycin (Zithromax) 250 MG tablet; Take two tabs (500 mg) by mouth on day 1.  Take one tab (250 mg) by mouth on days 2-5.  Dispense: 6 tablet; Refill: 0    3. Essential hypertension      HTN:  -above goal, hasn't taken meds today  -I discussed with her the importance of consistent medication use    Misc:  -I did encourage her to reschedule the cancelled mammogram appointment      There are no discontinued medications.       Follow Up   Return if symptoms worsen or fail to improve.  Patient was given instructions and counseling regarding her condition or for health maintenance advice. Please see specific information pulled into the AVS if appropriate.       Thierno Li MD  09/21/23  12:56 EDT               Scheduled/Direct

## 2023-09-28 PROBLEM — E55.9 VITAMIN D DEFICIENCY, UNSPECIFIED: Chronic | Status: ACTIVE | Noted: 2018-07-19

## 2023-09-28 PROBLEM — E03.9 HYPOTHYROIDISM, UNSPECIFIED: Chronic | Status: ACTIVE | Noted: 2018-07-19

## 2023-09-28 PROBLEM — N39.0 URINARY TRACT INFECTION, SITE NOT SPECIFIED: Chronic | Status: ACTIVE | Noted: 2018-07-19

## 2023-09-28 PROBLEM — G47.33 OBSTRUCTIVE SLEEP APNEA (ADULT) (PEDIATRIC): Chronic | Status: ACTIVE | Noted: 2018-07-19

## 2023-09-28 PROBLEM — I35.9 NONRHEUMATIC AORTIC VALVE DISORDER, UNSPECIFIED: Chronic | Status: ACTIVE | Noted: 2018-07-19

## 2023-09-28 PROBLEM — E78.00 PURE HYPERCHOLESTEROLEMIA, UNSPECIFIED: Chronic | Status: ACTIVE | Noted: 2018-07-19

## 2023-09-28 PROBLEM — K21.9 GASTRO-ESOPHAGEAL REFLUX DISEASE WITHOUT ESOPHAGITIS: Chronic | Status: ACTIVE | Noted: 2018-07-19

## 2023-09-28 PROBLEM — J45.909 UNSPECIFIED ASTHMA, UNCOMPLICATED: Chronic | Status: ACTIVE | Noted: 2018-07-19

## 2023-09-28 PROBLEM — I87.2 VENOUS INSUFFICIENCY (CHRONIC) (PERIPHERAL): Chronic | Status: ACTIVE | Noted: 2018-07-19

## 2023-09-28 PROBLEM — M17.10 UNILATERAL PRIMARY OSTEOARTHRITIS, UNSPECIFIED KNEE: Chronic | Status: ACTIVE | Noted: 2018-07-19

## 2023-09-28 PROBLEM — M47.816 SPONDYLOSIS WITHOUT MYELOPATHY OR RADICULOPATHY, LUMBAR REGION: Chronic | Status: ACTIVE | Noted: 2018-07-19

## 2023-10-25 ENCOUNTER — APPOINTMENT (OUTPATIENT)
Dept: ORTHOPEDIC SURGERY | Facility: CLINIC | Age: 73
End: 2023-10-25
Payer: MEDICARE

## 2023-10-25 VITALS — WEIGHT: 220 LBS | BODY MASS INDEX: 43.19 KG/M2 | RESPIRATION RATE: 16 BRPM | HEIGHT: 60 IN

## 2023-10-25 DIAGNOSIS — Z87.09 PERSONAL HISTORY OF OTHER DISEASES OF THE RESPIRATORY SYSTEM: ICD-10-CM

## 2023-10-25 DIAGNOSIS — Z86.69 PERSONAL HISTORY OF OTHER DISEASES OF THE NERVOUS SYSTEM AND SENSE ORGANS: ICD-10-CM

## 2023-10-25 DIAGNOSIS — R20.0 ANESTHESIA OF SKIN: ICD-10-CM

## 2023-10-25 DIAGNOSIS — Z86.39 PERSONAL HISTORY OF OTHER ENDOCRINE, NUTRITIONAL AND METABOLIC DISEASE: ICD-10-CM

## 2023-10-25 DIAGNOSIS — R20.2 ANESTHESIA OF SKIN: ICD-10-CM

## 2023-10-25 DIAGNOSIS — I51.9 HEART DISEASE, UNSPECIFIED: ICD-10-CM

## 2023-10-25 DIAGNOSIS — M65.322 TRIGGER FINGER, LEFT INDEX FINGER: ICD-10-CM

## 2023-10-25 DIAGNOSIS — M65.342 TRIGGER FINGER, LEFT RING FINGER: ICD-10-CM

## 2023-10-25 PROCEDURE — 99204 OFFICE O/P NEW MOD 45 MIN: CPT | Mod: 25

## 2023-10-25 PROCEDURE — 20550 NJX 1 TENDON SHEATH/LIGAMENT: CPT

## 2023-10-25 PROCEDURE — 76882 US LMTD JT/FCL EVL NVASC XTR: CPT

## 2023-10-25 PROCEDURE — 73130 X-RAY EXAM OF HAND: CPT | Mod: 50

## 2023-10-25 RX ORDER — LEVOTHYROXINE SODIUM 0.17 MG/1
TABLET ORAL
Refills: 0 | Status: ACTIVE | COMMUNITY

## 2024-08-14 ENCOUNTER — APPOINTMENT (OUTPATIENT)
Dept: ORTHOPEDIC SURGERY | Facility: CLINIC | Age: 74
End: 2024-08-14
Payer: MEDICARE

## 2024-08-14 VITALS — BODY MASS INDEX: 43.19 KG/M2 | RESPIRATION RATE: 16 BRPM | WEIGHT: 220 LBS | HEIGHT: 60 IN

## 2024-08-14 DIAGNOSIS — M65.342 TRIGGER FINGER, LEFT RING FINGER: ICD-10-CM

## 2024-08-14 DIAGNOSIS — R20.0 ANESTHESIA OF SKIN: ICD-10-CM

## 2024-08-14 DIAGNOSIS — R20.2 ANESTHESIA OF SKIN: ICD-10-CM

## 2024-08-14 DIAGNOSIS — M65.322 TRIGGER FINGER, LEFT INDEX FINGER: ICD-10-CM

## 2024-08-14 PROCEDURE — 99214 OFFICE O/P EST MOD 30 MIN: CPT

## 2024-08-14 NOTE — HISTORY OF PRESENT ILLNESS
[Right] : right hand dominant [Ambidextrous] : ambidextrous [FreeTextEntry1] : Patient presents for follow-up evaluation of recurrent left ring trigger finger and cyst.  She also presents for evaluation of left index finger clicking sensation worsening within the past 6-8 months.  No new injury or trauma.  No injections for left index finger.  She states she had left dorsal index finger injury with a  about 8 years ago.  Patient states she received one cortisone injection into the left ring finger in January 2023 with no relief of symptoms by an outside physician.  She then received a second injection by Dr. Jackson on October 25, 2023 for left ring finger mass aspiration and injection which lasted about 3 months.  She states mass is increased in size and notices clicking sensation of the finger.   Patient is status post left carpal tunnel release in 2017 by Dr. Vladimir Thompson. Patient  brought EMG done June 20, 2016 which showed abnormal study with electrophysiologic evidence of bilateral median mononeuropathies at the wrist, mild on the right and moderate to severe on the left, compatible with carpal tunnel syndrome.  There is no evidence of brachial plexopathy, cervical radiculopathy or myopathy.  Patient has brought EMG from February 5, 2024.  EMG showed abnormal study with evidence of mild to moderate left median left median mononeuropathy at the wrist.  There is no evidence of other mononeuropathy, left brachial plexopathy, left cervical radiculopathy or myopathy.   She states she had right carpal tunnel release in March 2021 by Dr. Larose and noticed improvement in numbness but still has weakness. Last EMG was done in January 2023 of the right upper extremity at [facility] by Dr. Dick.   She also had right middle trigger finger release and cyst removal in August 2021 by Dr. Lee.

## 2024-08-14 NOTE — PHYSICAL EXAM
[de-identified] : Tender over A1 pulley left index and ring finger with triggering and locking.  Well-healed carpal tunnel incision and good strength of abductor.  There is a cystic mass at the level of the A1 pulley of the left ring finger

## 2024-08-14 NOTE — PHYSICAL EXAM
[de-identified] : Tender over A1 pulley left index and ring finger with triggering and locking.  Well-healed carpal tunnel incision and good strength of abductor.  There is a cystic mass at the level of the A1 pulley of the left ring finger

## 2024-08-14 NOTE — HISTORY OF PRESENT ILLNESS
[Right] : right hand dominant [Ambidextrous] : ambidextrous [FreeTextEntry1] : Patient presents for follow-up evaluation of recurrent left ring trigger finger and cyst.  She also presents for evaluation of left index finger clicking sensation worsening within the past 6-8 months.  No new injury or trauma.  No injections for left index finger.  She states she had left dorsal index finger injury with a  about 8 years ago.  Patient states she received one cortisone injection into the left ring finger in January 2023 with no relief of symptoms by an outside physician.  She then received a second injection by Dr. Jackson on October 25, 2023 for left ring finger mass aspiration and injection which lasted about 3 months.  She states mass is increased in size and notices clicking sensation of the finger.   Patient is status post left carpal tunnel release in 2017 by Dr. Vladimir Thompson. Patient  brought EMG done June 20, 2016 which showed abnormal study with electrophysiologic evidence of bilateral median mononeuropathies at the wrist, mild on the right and moderate to severe on the left, compatible with carpal tunnel syndrome.  There is no evidence of brachial plexopathy, cervical radiculopathy or myopathy.  Patient has brought EMG from February 5, 2024.  EMG showed abnormal study with evidence of mild to moderate left median left median mononeuropathy at the wrist.  There is no evidence of other mononeuropathy, left brachial plexopathy, left cervical radiculopathy or myopathy.   She states she had right carpal tunnel release in March 2021 by Dr. Larose and noticed improvement in numbness but still has weakness. Last EMG was done in January 2023 of the right upper extremity at [facility] by Dr. Dick.   She also had right middle trigger finger release and cyst removal in August 2021 by Dr. Lee.

## 2024-08-14 NOTE — ASSESSMENT
[FreeTextEntry1] : EMG reviewed demonstrating severe left carpal tunnel that was done in 2016 and mild to moderate in 2024.  Patient not having numbness or tingling just has recurrent tenosynovial cyst associated with trigger digits.  Recommend she undergo left ring finger trigger finger decompression\tenosynovial cyst excision combined with left index trigger finger release  The risks benefits and alternatives were discussed with the patient including, but not limited to:   infection, nerve injury, need for FDS slip excision, CRPS, anesthetic block injury, persistent symptoms, scar sensitivity, PIP contracture, reecurrence, pulley injury, recurrence etc.  The patient would like to proceed with surgery.  Shared decision-making was undertaken

## 2024-08-14 NOTE — REVIEW OF SYSTEMS
[Ambidextrous] : ambidextrous [Arthralgia] : arthralgia [Joint Pain] : joint pain [Joint Stiffness] : joint stiffness [Negative] : Allergic/Immunologic

## 2024-09-11 ENCOUNTER — TRANSCRIPTION ENCOUNTER (OUTPATIENT)
Age: 74
End: 2024-09-11

## 2024-09-11 RX ORDER — CHLORHEXIDINE GLUCONATE 40 MG/ML
1 SOLUTION TOPICAL DAILY
Refills: 0 | Status: DISCONTINUED | OUTPATIENT
Start: 2024-09-12 | End: 2024-09-12

## 2024-09-11 NOTE — ASU PATIENT PROFILE, ADULT - NSICDXPASTMEDICALHX_GEN_ALL_CORE_FT
PAST MEDICAL HISTORY:  Aortic valve disorder     Asthma     GERD (gastroesophageal reflux disease)     Hypercholesterolemia     Hypothyroidism     Lumbar spondylosis     MERYL (obstructive sleep apnea) syndrome    MERYL on CPAP     Osteoarthritis of knee     Peripheral venous insufficiency     Recurrent urinary tract infection     Vitamin D deficiency      PAST MEDICAL HISTORY:  Aortic valve disorder     Arthritis     Asthma     GERD (gastroesophageal reflux disease)     History of spinal stenosis     Hypercholesterolemia     Hypothyroidism     Lumbar spondylosis     MERYL (obstructive sleep apnea) syndrome    MERYL on CPAP     Osteoarthritis of knee     Peripheral venous insufficiency     Recurrent urinary tract infection     Vitamin D deficiency

## 2024-09-11 NOTE — ASU PATIENT PROFILE, ADULT - NS PREOP UNDERSTANDS INFO
Spoke to patient to be NPO/NO solid foods after  2200 pm  , allow to drink water or apple  juice till  12Mn. dress comfortable,  no lotions, no jewelry , bring ID photo and insurance cards , escort arranged,  address  and telephone  given

## 2024-09-11 NOTE — ASU PATIENT PROFILE, ADULT - FALL HARM RISK - HARM RISK INTERVENTIONS

## 2024-09-11 NOTE — ASU PATIENT PROFILE, ADULT - NSICDXPASTSURGICALHX_GEN_ALL_CORE_FT
PAST SURGICAL HISTORY:  H/O arthroscopy of left knee     H/O arthroscopy of right knee x2    H/O carpal tunnel repair left    H/O vascular surgery left lower extremity    History of cholecystectomy     History of pubovaginal sling     History of total hysterectomy     S/P rotator cuff repair right

## 2024-09-12 ENCOUNTER — APPOINTMENT (OUTPATIENT)
Dept: ORTHOPEDIC SURGERY | Facility: AMBULATORY SURGERY CENTER | Age: 74
End: 2024-09-12

## 2024-09-12 ENCOUNTER — TRANSCRIPTION ENCOUNTER (OUTPATIENT)
Age: 74
End: 2024-09-12

## 2024-09-12 ENCOUNTER — RESULT REVIEW (OUTPATIENT)
Age: 74
End: 2024-09-12

## 2024-09-12 ENCOUNTER — OUTPATIENT (OUTPATIENT)
Dept: OUTPATIENT SERVICES | Facility: HOSPITAL | Age: 74
LOS: 1 days | Discharge: HOME | End: 2024-09-12
Payer: MEDICARE

## 2024-09-12 VITALS
WEIGHT: 210.98 LBS | SYSTOLIC BLOOD PRESSURE: 164 MMHG | RESPIRATION RATE: 16 BRPM | HEIGHT: 60 IN | TEMPERATURE: 100 F | DIASTOLIC BLOOD PRESSURE: 64 MMHG | OXYGEN SATURATION: 95 % | HEART RATE: 86 BPM

## 2024-09-12 VITALS
TEMPERATURE: 98 F | HEART RATE: 71 BPM | OXYGEN SATURATION: 99 % | SYSTOLIC BLOOD PRESSURE: 162 MMHG | DIASTOLIC BLOOD PRESSURE: 63 MMHG | RESPIRATION RATE: 16 BRPM

## 2024-09-12 DIAGNOSIS — Z90.710 ACQUIRED ABSENCE OF BOTH CERVIX AND UTERUS: Chronic | ICD-10-CM

## 2024-09-12 DIAGNOSIS — Z98.890 OTHER SPECIFIED POSTPROCEDURAL STATES: Chronic | ICD-10-CM

## 2024-09-12 DIAGNOSIS — Z90.49 ACQUIRED ABSENCE OF OTHER SPECIFIED PARTS OF DIGESTIVE TRACT: Chronic | ICD-10-CM

## 2024-09-12 PROCEDURE — 26055 INCISE FINGER TENDON SHEATH: CPT | Mod: F1

## 2024-09-12 PROCEDURE — 88304 TISSUE EXAM BY PATHOLOGIST: CPT | Mod: 26

## 2024-09-12 PROCEDURE — 26160 REMOVE TENDON SHEATH LESION: CPT | Mod: F3

## 2024-09-12 RX ADMIN — CHLORHEXIDINE GLUCONATE 1 APPLICATION(S): 40 SOLUTION TOPICAL at 09:25

## 2024-09-12 RX ADMIN — Medication 100 MILLILITER(S): at 12:19

## 2024-09-12 NOTE — ASU DISCHARGE PLAN (ADULT/PEDIATRIC) - PROCEDURE
left ring finger trigger finger decompression and cyst excision, left index trigger finger decompression

## 2024-09-12 NOTE — ASU DISCHARGE PLAN (ADULT/PEDIATRIC) - ASU DC SPECIAL INSTRUCTIONSFT
Co-Directors: Yonathan Lopez MD; MD Wesley Rivera MD   [facility] [place] [facility] [address] 	  [place] [address] 	 	  Phone [phone] (HAND), Fax [phone]    [email or web address]    Hand Surgery Post Operative Instructions      DRESSING CARE:  1.	Please keep bandage ON and DRY until you return to the office for your 1st postoperative visit.   2.	In the shower you must cover bandage with a plastic bag. You can use tape or a rubber band so no water leaks into the bag.   3.	Please do not exercise as that leads to excessive sweating as the bandage will therefore become moist/ wet.    4.	Do not remove or change your bandage. You may apply more tape if dressing starts to unravel.   5.	Please do not insert any objects, such as a pencil, down into the bandage.     ELEVATION:  1.	Keep hand/wrist above heart level at all times or until bandage feels loose. This will help with swelling of the fingers and can be accomplished by using the FOAM PILLOW.   2.	 A sling will not hold your hand/wrist above your heart and therefore its use should be limited (it may also cause shoulder stiffness).     ACTIVITY:  1.	Moving your fingers daily after surgery is very important to prevent stiffness. Please open your fingers completely and close your fingers completely to achieve full range of motion.  **UNLESS TENDON REPAIR OR NERVE REPAIR SURGERY PERFORMED    2.	Move all joints of the extremity that are not immobilized to prevent stiffness (i.e. shoulder, elbow, fingers, and thumb unless instructed otherwise).   3.	Avoid activities which may re-injure your hand or finger.     DIET:   Regular diet. Start light and progress as tolerated.     PAIN MEDICINE:   1.	Pain medicine was sent to your pharmacy.  2.	Take pain medicine on an “AS NEEDED” basis according to your doctor’s instructions.   3.	Your pain will decrease over the next few days allowing you to:   •	Decrease your pain medicine quantity until you stop.   •	Increase the time between doses until you stop.   4.	You should not drink alcoholic beverages while on pain medication.   5.	Take pain medicine with food to prevent nausea.   6.	Constipation can occur. If no bowel movement occurs within 48 hours take a laxative of your choice (over the counter).	 	      CONTACT PHYSICIAN FOR:   Slight pain, swelling and bluish discoloration are to be expected. If you have breathing difficulty or chest pain dial 911 immediately. However, if the following symptoms occur notify your physician:   •	Temperature above 101° F 	        • Inability to urinate in 8 hours   •	Uncontrolled nausea/vomiting   	• Progressively increasing pain   •	Signs of wound infection 	                • Excessive bleeding  (Redness, swelling, pus-like drainage)     • Increasing numbness   •	Excessive swelling and tightness 	• Splint or cast that is too tight      OFFICE APPOINTMENT:    A staff member from the office will call you in the next 1-2 days to schedule your 1st Post Operative appointment to see your physician back in the office. *IF someone does not reach out to you in the next 1-2 days please call the office.      Patient Name _________________________________ Signature ______________________________ Date__________    Witness _____________________________________________________ Date ______________

## 2024-09-12 NOTE — ASU DISCHARGE PLAN (ADULT/PEDIATRIC) - CARE PROVIDER_API CALL
Stefano Jackson  [facility]  [address]  [place] [address]  Phone: [phone]  Fax: [phone]  Follow Up Time:

## 2024-09-12 NOTE — PRE-ANESTHESIA EVALUATION ADULT - NSANTHADDINFOFT_GEN_ALL_CORE
patient and surgeon prefer local only  will do MAC/ possible sedation if necessary/ possible GA  patient with ?lidocaine intolerance. patient ok with using lidocaine for local anesthesia today  all questions answered

## 2024-09-12 NOTE — BRIEF OPERATIVE NOTE - FIRST ASSIST
LUCINDA/CORDELL Discharge Plan  Informed patient is ready for discharge. Patientâs discharge destination is Home/apartment. Patient to be picked up by godmother/father. Patient/interested person has been counseled for post hospitalization care. Initial implementation of the patientâs discharge plan has been arranged, including any devices/equipment needed for discharge. Discharge plan communicated to MD, RN, WEST, CORDELL and LUCINDA.    Multi rounding with Dr. Enid Gómez, pharmacist, and RN regard pt's plan of care. Pt is ready for discharge today per Dr. Enid Gómez. Pt will return to apartment and godmother/father will provide transportation. Pt denied further needs or concerns for discharge.
Nutrition Care Plan    Nutrition Diagnosis:   Food-and-nutrition-related knowledge deficit  related to limited previous Pancreatitis and High Triglyceride Nutrition Theray as evidenced by patient request for discussion on same and patient questions for RD. Intervention:  Modified diet:  Full Liquid, Soft/[place], Low Fat (50 gm)   Recommended modifications:  RD reviewed following with patient:  --Rationale for Pancreatitis and High Trig Nutrition Therapy  --Encouraged small, low fat meals and plenty of non-caloric fluids  --Emphasized avoidance of alcohol  --Reviewed Foods Recommended versus Foods Not Recommended for Pancreatitis and High Trig  --Reviewed label reading with focus on total fat  --Discussed low-fat cooking methods  --Provided sample menu that is consistent with nutrition recommendations  Provided printed material from Nutrition Care Manual:  --Pancreatitis Nutrition Therapy  --Pancreatitis Label Reading Tips  --High Triglycerides Nutrition Therapy  Collaboration/referral to other provider:  Encouraged patient to participate in outpatient MNT/Diabetes-Self-Management. Provided pamphlet for [facility] and discussed how to obtain referral.     Monitoring and Evaluation:  Area(s) and level of knowledge:  Patient with moderate level of understanding of therapeutic diet recommendations. Goal for retention of same. Digestive system (mouth to rectum):  Goal for patient to tolerate oral intake with minimal adverse gi symptoms.
Problem: Pain  Goal: #Acceptable pain level achieved/maintained at rest using NRS/Faces  This goal is used for patients who can self-report. Acceptable means the level is at or below the identified comfort/function goal.  Outcome: Outcome Met, Continue evaluating goal progress toward completion  Pt c/o mostly right side lower back pain, not relieved with morphine. Pain acceptable after Norco.    Problem: VTE, Risk for  Goal: # No s/s of VTE  Outcome: Outcome Met, Continue evaluating goal progress toward completion  Refusing TEDs and SCDs at this time. May like to have SCDs tomorrow.
PA/NP...

## 2024-09-13 PROBLEM — M19.90 UNSPECIFIED OSTEOARTHRITIS, UNSPECIFIED SITE: Chronic | Status: ACTIVE | Noted: 2024-09-12

## 2024-09-13 PROBLEM — Z87.39 PERSONAL HISTORY OF OTHER DISEASES OF THE MUSCULOSKELETAL SYSTEM AND CONNECTIVE TISSUE: Chronic | Status: ACTIVE | Noted: 2024-09-12

## 2024-09-20 ENCOUNTER — APPOINTMENT (OUTPATIENT)
Dept: ORTHOPEDIC SURGERY | Facility: CLINIC | Age: 74
End: 2024-09-20
Payer: MEDICARE

## 2024-09-20 DIAGNOSIS — M65.342 TRIGGER FINGER, LEFT RING FINGER: ICD-10-CM

## 2024-09-20 DIAGNOSIS — M65.322 TRIGGER FINGER, LEFT INDEX FINGER: ICD-10-CM

## 2024-09-20 LAB — SURGICAL PATHOLOGY STUDY: SIGNIFICANT CHANGE UP

## 2024-09-20 PROCEDURE — 99024 POSTOP FOLLOW-UP VISIT: CPT

## 2024-09-20 NOTE — HISTORY OF PRESENT ILLNESS
[Clean/Dry/Intact] : clean, dry and intact [Healed] : healed [Neuro Intact] : an unremarkable neurological exam [Vascular Intact] : ~T peripheral vascular exam normal [Doing Well] : is doing well [No Sign of Infection] : is showing no signs of infection [Adequate Pain Control] : has adequate pain control [Sutures Removed] : sutures were removed [Steri-Strips Removed & Replaced] : steri-strips removed and replaced [Chills] : no chills [Fever] : no fever [Erythema] : not erythematous [Discharge] : absent of discharge [Dehiscence] : not dehisced [de-identified] : DOS: 9/12/24 [de-identified] : 8 days s/p Left index finger, left ring fingers flexor mechanism decompression. Left ring finger tenosynovial cyst excision. Left index finger flexor mechanism decompression. Pathology not back yet.  Patient states she noticed left index finger PIP cracking sensation right after surgery which has been improving.  [de-identified] : Incisions well-healed, no evidence of infection, minimal swelling, sensation grossly intact to light touch, good digital range of motion of left index finger with no PIP contracture and no crepitus felt, near full flexion of left ring finger with a about 5 to 10 degree PIP contracture [de-identified] : 8 days s/p Left index finger, left ring fingers flexor mechanism decompression. Left ring finger tenosynovial cyst excision. Left index finger flexor mechanism decompression. [de-identified] : Sutures removed, benzoin Steri-Strips applied Patient will be seen by thenar therapist today for a left ring finger PIP extension splint to be worn at nighttime only.  If it PIP contracture develops of the left index finger a PIP extension splint will be made by the outside therapist which was included in the therapy prescription.  Discussed with patient left index finger crepitus and could consider postop injection with Dr. Jackson at next visit if symptoms worsen  Patient will begin home exercise program shown today and begin therapy next week Follow-up in 6 weeks from today or sooner if any concerns.

## 2024-09-23 NOTE — OCCUPATIONAL THERAPY INITIAL EVALUATION ADULT - LIGHT TOUCH SENSATION, RLE, REHAB EVAL
Follow up with Esdras Dawkins MD (Neurology) in 2 weeks (10/5/2024)     Please call pt with appt date/time.    within normal limits

## 2024-11-04 ENCOUNTER — APPOINTMENT (OUTPATIENT)
Dept: ORTHOPEDIC SURGERY | Facility: CLINIC | Age: 74
End: 2024-11-04

## 2024-11-04 DIAGNOSIS — M65.342 TRIGGER FINGER, LEFT RING FINGER: ICD-10-CM

## 2024-11-04 DIAGNOSIS — M65.341 TRIGGER FINGER, RIGHT RING FINGER: ICD-10-CM

## 2024-11-04 DIAGNOSIS — M65.322 TRIGGER FINGER, LEFT INDEX FINGER: ICD-10-CM

## 2024-11-04 PROCEDURE — 99214 OFFICE O/P EST MOD 30 MIN: CPT | Mod: 24,25

## 2024-11-04 PROCEDURE — 20550 NJX 1 TENDON SHEATH/LIGAMENT: CPT | Mod: 58,LT

## (undated) DEVICE — GLV 8.5 PROTEXIS (WHITE)

## (undated) DEVICE — PACK HAND

## (undated) DEVICE — MARKING PEN W RULER

## (undated) DEVICE — SUT MONOCRYL 5-0 18" P-3 UNDYED

## (undated) DEVICE — GLV 8 PROTEXIS (WHITE)

## (undated) DEVICE — TOURNIQUET CUFF 18" DUAL PORT SINGLE BLADDER W PLC  (BLACK)

## (undated) DEVICE — VENODYNE/SCD SLEEVE CALF MEDIUM

## (undated) DEVICE — WARMING BLANKET LOWER ADULT

## (undated) DEVICE — SUT ETHILON 5-0 18" P-3

## (undated) DEVICE — SUT SILK 4-0 18" PS-2